# Patient Record
Sex: FEMALE | Race: WHITE | NOT HISPANIC OR LATINO | Employment: UNEMPLOYED | RURAL
[De-identification: names, ages, dates, MRNs, and addresses within clinical notes are randomized per-mention and may not be internally consistent; named-entity substitution may affect disease eponyms.]

---

## 2022-03-28 ENCOUNTER — OFFICE VISIT (OUTPATIENT)
Dept: FAMILY MEDICINE | Facility: CLINIC | Age: 32
End: 2022-03-28
Payer: OTHER GOVERNMENT

## 2022-03-28 VITALS
BODY MASS INDEX: 34.72 KG/M2 | TEMPERATURE: 98 F | HEART RATE: 98 BPM | DIASTOLIC BLOOD PRESSURE: 73 MMHG | HEIGHT: 66 IN | SYSTOLIC BLOOD PRESSURE: 119 MMHG | WEIGHT: 216 LBS | RESPIRATION RATE: 18 BRPM

## 2022-03-28 DIAGNOSIS — E53.9 VITAMIN B DEFICIENCY: ICD-10-CM

## 2022-03-28 DIAGNOSIS — F32.A DEPRESSION, UNSPECIFIED DEPRESSION TYPE: ICD-10-CM

## 2022-03-28 DIAGNOSIS — Z13.220 SCREENING FOR LIPID DISORDERS: ICD-10-CM

## 2022-03-28 DIAGNOSIS — R73.01 IMPAIRED FASTING BLOOD SUGAR: ICD-10-CM

## 2022-03-28 DIAGNOSIS — R53.83 FATIGUE, UNSPECIFIED TYPE: Primary | ICD-10-CM

## 2022-03-28 DIAGNOSIS — E55.9 VITAMIN D DEFICIENCY: ICD-10-CM

## 2022-03-28 PROBLEM — I10 HYPERTENSION: Status: ACTIVE | Noted: 2022-03-28

## 2022-03-28 LAB
25(OH)D3 SERPL-MCNC: 24.1 NG/ML
ALBUMIN SERPL BCP-MCNC: 3.7 G/DL (ref 3.5–5)
ALBUMIN/GLOB SERPL: 1.1 {RATIO}
ALP SERPL-CCNC: 68 U/L (ref 37–98)
ALT SERPL W P-5'-P-CCNC: 23 U/L (ref 13–56)
ANION GAP SERPL CALCULATED.3IONS-SCNC: 10 MMOL/L (ref 7–16)
AST SERPL W P-5'-P-CCNC: 16 U/L (ref 15–37)
BILIRUB SERPL-MCNC: 0.3 MG/DL (ref 0–1.2)
BUN SERPL-MCNC: 9 MG/DL (ref 7–18)
BUN/CREAT SERPL: 10 (ref 6–20)
CALCIUM SERPL-MCNC: 9 MG/DL (ref 8.5–10.1)
CHLORIDE SERPL-SCNC: 107 MMOL/L (ref 98–107)
CHOLEST SERPL-MCNC: 192 MG/DL (ref 0–200)
CHOLEST/HDLC SERPL: 3.7 {RATIO}
CO2 SERPL-SCNC: 28 MMOL/L (ref 21–32)
CREAT SERPL-MCNC: 0.91 MG/DL (ref 0.55–1.02)
CREAT UR-MCNC: 212 MG/DL (ref 28–219)
EST. AVERAGE GLUCOSE BLD GHB EST-MCNC: 81 MG/DL
FOLATE SERPL-MCNC: 6 NG/ML (ref 3.1–17.5)
GLOBULIN SER-MCNC: 3.4 G/DL (ref 2–4)
GLUCOSE SERPL-MCNC: 70 MG/DL (ref 74–106)
HBA1C MFR BLD HPLC: 5 % (ref 4.5–6.6)
HDLC SERPL-MCNC: 52 MG/DL (ref 40–60)
LDLC SERPL CALC-MCNC: 111 MG/DL
LDLC/HDLC SERPL: 2.1 {RATIO}
MICROALBUMIN UR-MCNC: 1.7 MG/DL (ref 0–2.8)
MICROALBUMIN/CREAT RATIO PNL UR: 8 MG/G (ref 0–30)
NONHDLC SERPL-MCNC: 140 MG/DL
POTASSIUM SERPL-SCNC: 4.5 MMOL/L (ref 3.5–5.1)
PROT SERPL-MCNC: 7.1 G/DL (ref 6.4–8.2)
SODIUM SERPL-SCNC: 140 MMOL/L (ref 136–145)
TRIGL SERPL-MCNC: 145 MG/DL (ref 35–150)
TSH SERPL DL<=0.005 MIU/L-ACNC: 2.54 UIU/ML (ref 0.36–3.74)
VIT B12 SERPL-MCNC: 369 PG/ML (ref 193–986)
VLDLC SERPL-MCNC: 29 MG/DL

## 2022-03-28 PROCEDURE — 82043 UR ALBUMIN QUANTITATIVE: CPT | Mod: ,,, | Performed by: CLINICAL MEDICAL LABORATORY

## 2022-03-28 PROCEDURE — 82043 MICROALBUMIN / CREATININE RATIO URINE: ICD-10-PCS | Mod: ,,, | Performed by: CLINICAL MEDICAL LABORATORY

## 2022-03-28 PROCEDURE — 99213 PR OFFICE/OUTPT VISIT, EST, LEVL III, 20-29 MIN: ICD-10-PCS | Mod: ,,, | Performed by: NURSE PRACTITIONER

## 2022-03-28 PROCEDURE — 84443 ASSAY THYROID STIM HORMONE: CPT | Mod: ,,, | Performed by: CLINICAL MEDICAL LABORATORY

## 2022-03-28 PROCEDURE — 82607 VITAMIN B-12: CPT | Mod: ,,, | Performed by: CLINICAL MEDICAL LABORATORY

## 2022-03-28 PROCEDURE — 82746 VITAMIN B12/FOLATE, SERUM PANEL: ICD-10-PCS | Mod: ,,, | Performed by: CLINICAL MEDICAL LABORATORY

## 2022-03-28 PROCEDURE — 82570 ASSAY OF URINE CREATININE: CPT | Mod: ,,, | Performed by: CLINICAL MEDICAL LABORATORY

## 2022-03-28 PROCEDURE — 99213 OFFICE O/P EST LOW 20 MIN: CPT | Mod: ,,, | Performed by: NURSE PRACTITIONER

## 2022-03-28 PROCEDURE — 82306 VITAMIN D: ICD-10-PCS | Mod: ,,, | Performed by: CLINICAL MEDICAL LABORATORY

## 2022-03-28 PROCEDURE — 83036 HEMOGLOBIN A1C: ICD-10-PCS | Mod: ,,, | Performed by: CLINICAL MEDICAL LABORATORY

## 2022-03-28 PROCEDURE — 80061 LIPID PANEL: ICD-10-PCS | Mod: ,,, | Performed by: CLINICAL MEDICAL LABORATORY

## 2022-03-28 PROCEDURE — 80061 LIPID PANEL: CPT | Mod: ,,, | Performed by: CLINICAL MEDICAL LABORATORY

## 2022-03-28 PROCEDURE — 82306 VITAMIN D 25 HYDROXY: CPT | Mod: ,,, | Performed by: CLINICAL MEDICAL LABORATORY

## 2022-03-28 PROCEDURE — 82607 VITAMIN B12/FOLATE, SERUM PANEL: ICD-10-PCS | Mod: ,,, | Performed by: CLINICAL MEDICAL LABORATORY

## 2022-03-28 PROCEDURE — 80053 COMPREHENSIVE METABOLIC PANEL: ICD-10-PCS | Mod: ,,, | Performed by: CLINICAL MEDICAL LABORATORY

## 2022-03-28 PROCEDURE — 82570 MICROALBUMIN / CREATININE RATIO URINE: ICD-10-PCS | Mod: ,,, | Performed by: CLINICAL MEDICAL LABORATORY

## 2022-03-28 PROCEDURE — 80053 COMPREHEN METABOLIC PANEL: CPT | Mod: ,,, | Performed by: CLINICAL MEDICAL LABORATORY

## 2022-03-28 PROCEDURE — 84443 TSH: ICD-10-PCS | Mod: ,,, | Performed by: CLINICAL MEDICAL LABORATORY

## 2022-03-28 PROCEDURE — 82746 ASSAY OF FOLIC ACID SERUM: CPT | Mod: ,,, | Performed by: CLINICAL MEDICAL LABORATORY

## 2022-03-28 PROCEDURE — 83036 HEMOGLOBIN GLYCOSYLATED A1C: CPT | Mod: ,,, | Performed by: CLINICAL MEDICAL LABORATORY

## 2022-03-28 RX ORDER — TRAZODONE HYDROCHLORIDE 100 MG/1
100 TABLET ORAL NIGHTLY
Qty: 30 TABLET | Refills: 11 | Status: SHIPPED | OUTPATIENT
Start: 2022-03-28 | End: 2022-04-19

## 2022-03-28 RX ORDER — ARIPIPRAZOLE 5 MG/1
TABLET ORAL
COMMUNITY
End: 2022-04-19

## 2022-03-28 RX ORDER — IBUPROFEN 800 MG/1
800 TABLET ORAL EVERY 6 HOURS PRN
Qty: 30 TABLET | Refills: 3 | Status: SHIPPED | OUTPATIENT
Start: 2022-03-28 | End: 2022-04-19

## 2022-03-28 RX ORDER — DULOXETIN HYDROCHLORIDE 60 MG/1
CAPSULE, DELAYED RELEASE ORAL
COMMUNITY
End: 2022-06-27

## 2022-03-28 RX ORDER — HYDROXYZINE PAMOATE 25 MG/1
25 CAPSULE ORAL EVERY 6 HOURS PRN
Qty: 45 CAPSULE | Refills: 0 | Status: SHIPPED | OUTPATIENT
Start: 2022-03-28 | End: 2022-04-19

## 2022-03-28 NOTE — PROGRESS NOTES
"   ALIN Sanford   [address] [place] [address]     PATIENT NAME: Esthela Robles  : 1990  DATE: 3/28/22  MRN: [id number]      Billing Provider: ALIN Sanford  Level of Service: [place] OFFICE/OUTPT VISIT, EST, LEVL III, 20-29 MIN  Patient PCP Information     Provider PCP Type    [facility]          Reason for Visit / Chief Complaint: Establish Care       Update PCP  Update Chief Complaint         History of Present Illness / Problem Focused Workflow     Esthela Robles presents to the clinic with Establish Care     HPI    Review of Systems     Review of Systems   Psychiatric/Behavioral: Positive for decreased concentration. Negative for suicidal ideas. The patient is nervous/anxious.         Medical / Social / Family History   History reviewed. No pertinent past medical history.    History reviewed. No pertinent surgical history.    Social History  Ms.  reports that she has been smoking cigarettes. She has never used smokeless tobacco. She reports that she does not drink alcohol and does not use drugs.    Family History  Ms.'s family history is not on file.    No flowsheet data found.        Medications and Allergies     Medications  No outpatient medications have been marked as taking for the 3/28/22 encounter (Office Visit) with ALIN Sanford.       Allergies  Review of patient's allergies indicates:   Allergen Reactions    Codeine        Physical Examination   /73 (BP Location: Left arm, Patient Position: Sitting, BP Method: Medium (Automatic))   Pulse 98   Temp 97.8 °F (36.6 °C) (Temporal)   Resp 18   Ht 5' 6" (1.676 m)   Wt 98 kg (216 lb)   BMI 34.86 kg/m²   Physical Exam  Vitals and nursing note reviewed.   Constitutional:       Appearance: Normal appearance.   HENT:      Head: Normocephalic and atraumatic.      Right Ear: External ear normal.      Left Ear: External ear normal.      Nose: Nose normal.      Mouth/Throat:      Mouth: Mucous membranes are " moist.      Pharynx: Oropharynx is clear.   Eyes:      Pupils: Pupils are equal, round, and reactive to light.   Cardiovascular:      Rate and Rhythm: Normal rate and regular rhythm.      Pulses: Normal pulses.      Heart sounds: Normal heart sounds. No murmur heard.    No gallop.   Pulmonary:      Effort: Pulmonary effort is normal.      Breath sounds: Normal breath sounds. No wheezing or rales.   Abdominal:      General: Bowel sounds are normal. There is no distension.      Palpations: Abdomen is soft.      Tenderness: There is no abdominal tenderness.   Musculoskeletal:      Cervical back: Normal range of motion and neck supple.   Skin:     General: Skin is warm and dry.   Neurological:      General: No focal deficit present.      Mental Status: She is alert and oriented to person, place, and time.   Psychiatric:         Mood and Affect: Mood is anxious and depressed.         Behavior: Behavior normal.          Assessment and Plan (including Health Maintenance)      Problem List  Smart Sets  Document Outside HM   :    Plan:         Health Maintenance Due   Topic Date Due    Hepatitis C Screening  Never done    Cervical Cancer Screening  Never done    Lipid Panel  Never done    COVID-19 Vaccine (1) Never done    Pneumococcal Vaccines (Age 0-64) (1 of 2 - PPSV23) Never done    HIV Screening  Never done    TETANUS VACCINE  Never done    Influenza Vaccine (1) Never done       Problem List Items Addressed This Visit        Psychiatric    Depression    Relevant Orders    TSH       Cardiac/Vascular    Hypertension - Primary    Screening for lipid disorders    Relevant Orders    Lipid Panel       Endocrine    Vitamin B deficiency    Relevant Orders    Vitamin B12 & Folate    Impaired fasting blood sugar    Relevant Orders    Hemoglobin A1C    Vitamin D deficiency    Relevant Orders    Vitamin D          The patient has no Health Maintenance topics of status Not Due    Future Appointments   Date Time Provider  Department Center   4/4/2022  1:15 PM ALIN Sanford [facility] EUNKYLIE Rubenshayla Brynn        Follow up in about 3 months (around 6/28/2022), or if symptoms worsen or fail to improve.        Signature:  ALIN Sanford      [address] [place] [address]    Date of encounter: 3/28/22

## 2022-04-04 ENCOUNTER — OFFICE VISIT (OUTPATIENT)
Dept: FAMILY MEDICINE | Facility: CLINIC | Age: 32
End: 2022-04-04
Payer: OTHER GOVERNMENT

## 2022-04-04 VITALS
WEIGHT: 216 LBS | SYSTOLIC BLOOD PRESSURE: 116 MMHG | RESPIRATION RATE: 20 BRPM | TEMPERATURE: 98 F | BODY MASS INDEX: 34.72 KG/M2 | HEIGHT: 66 IN | HEART RATE: 84 BPM | DIASTOLIC BLOOD PRESSURE: 75 MMHG

## 2022-04-04 DIAGNOSIS — F32.A DEPRESSION, UNSPECIFIED DEPRESSION TYPE: Primary | ICD-10-CM

## 2022-04-04 PROCEDURE — 99213 OFFICE O/P EST LOW 20 MIN: CPT | Mod: ,,, | Performed by: NURSE PRACTITIONER

## 2022-04-04 PROCEDURE — 99213 PR OFFICE/OUTPT VISIT, EST, LEVL III, 20-29 MIN: ICD-10-PCS | Mod: ,,, | Performed by: NURSE PRACTITIONER

## 2022-04-04 RX ORDER — ZOLMITRIPTAN 5 MG/1
5 TABLET, FILM COATED ORAL
Qty: 30 TABLET | Refills: 3 | Status: SHIPPED | OUTPATIENT
Start: 2022-04-04 | End: 2022-06-27

## 2022-04-04 RX ORDER — METHYLPREDNISOLONE 4 MG/1
TABLET ORAL
Qty: 21 EACH | Refills: 0 | Status: SHIPPED | OUTPATIENT
Start: 2022-04-04 | End: 2022-04-19

## 2022-04-04 RX ORDER — AMOXICILLIN AND CLAVULANATE POTASSIUM 875; 125 MG/1; MG/1
1 TABLET, FILM COATED ORAL EVERY 12 HOURS
Qty: 20 TABLET | Refills: 0 | Status: SHIPPED | OUTPATIENT
Start: 2022-04-04 | End: 2022-04-14

## 2022-04-04 RX ORDER — MINERAL OIL
180 ENEMA (ML) RECTAL DAILY
Qty: 30 TABLET | Refills: 11 | Status: SHIPPED | OUTPATIENT
Start: 2022-04-04 | End: 2023-04-18

## 2022-04-04 RX ORDER — FLUCONAZOLE 150 MG/1
150 TABLET ORAL DAILY
Qty: 3 TABLET | Refills: 0 | Status: SHIPPED | OUTPATIENT
Start: 2022-04-04 | End: 2022-04-05

## 2022-04-06 RX ORDER — TOPIRAMATE 100 MG/1
TABLET, FILM COATED ORAL
COMMUNITY
Start: 2021-06-14 | End: 2022-04-19

## 2022-04-06 RX ORDER — OMEPRAZOLE 40 MG/1
40 CAPSULE, DELAYED RELEASE ORAL DAILY
COMMUNITY
Start: 2022-02-19 | End: 2022-10-13 | Stop reason: SDUPTHER

## 2022-04-06 RX ORDER — NABUMETONE 500 MG/1
500 TABLET, FILM COATED ORAL 2 TIMES DAILY
COMMUNITY
Start: 2021-12-06 | End: 2022-04-19

## 2022-04-06 RX ORDER — TOPIRAMATE 25 MG/1
TABLET ORAL
COMMUNITY
Start: 2021-06-14 | End: 2022-04-19

## 2022-04-06 RX ORDER — RIMEGEPANT SULFATE 75 MG/75MG
TABLET, ORALLY DISINTEGRATING ORAL
COMMUNITY
Start: 2021-10-14 | End: 2022-07-21

## 2022-04-06 NOTE — PROGRESS NOTES
"   ALIN Sanford   [address] [place] [address]     PATIENT NAME: Esthela Robles  : 1990  DATE: 22  MRN: [id number]      Billing Provider: ALIN Sanford  Level of Service: [place] OFFICE/OUTPT VISIT, EST, LEVL III, 20-29 MIN  Patient PCP Information     Provider PCP Type    [facility]          Reason for Visit / Chief Complaint: Follow-up       Update PCP  Update Chief Complaint         History of Present Illness / Problem Focused Workflow     Esthela Robles presents to the clinic with Follow-up     HPI    Review of Systems     Review of Systems   Psychiatric/Behavioral: Positive for behavioral problems, decreased concentration and dysphoric mood. Negative for self-injury, sleep disturbance and suicidal ideas. The patient is nervous/anxious.         Medical / Social / Family History   History reviewed. No pertinent past medical history.    History reviewed. No pertinent surgical history.    Social History  Ms.  reports that she has been smoking cigarettes. She has never used smokeless tobacco. She reports that she does not drink alcohol and does not use drugs.    Family History  MsLindsey's family history is not on file.    No flowsheet data found.        Medications and Allergies     Medications  Outpatient Medications Marked as Taking for the 22 encounter (Office Visit) with ALIN Sanford   Medication Sig Dispense Refill    topiramate (TOPAMAX) 100 MG tablet       topiramate (TOPAMAX) 25 MG tablet          Allergies  Review of patient's allergies indicates:   Allergen Reactions    Codeine        Physical Examination   /75 (BP Location: Left arm, Patient Position: Sitting)   Pulse 84   Temp 98 °F (36.7 °C)   Resp 20   Ht 5' 6" (1.676 m)   Wt 98 kg (216 lb)   BMI 34.86 kg/m²   Physical Exam  Vitals and nursing note reviewed.   Constitutional:       Appearance: Normal appearance.   HENT:      Head: Normocephalic and atraumatic.      Right Ear: External ear " normal.      Left Ear: External ear normal.      Nose: Nose normal.      Mouth/Throat:      Mouth: Mucous membranes are moist.      Pharynx: Oropharynx is clear.   Eyes:      Pupils: Pupils are equal, round, and reactive to light.   Cardiovascular:      Rate and Rhythm: Normal rate and regular rhythm.      Pulses: Normal pulses.      Heart sounds: Normal heart sounds. No murmur heard.    No gallop.   Pulmonary:      Effort: Pulmonary effort is normal.      Breath sounds: Normal breath sounds. No wheezing or rales.   Abdominal:      General: Abdomen is flat. Bowel sounds are normal. There is no distension.      Palpations: Abdomen is soft.      Tenderness: There is no abdominal tenderness.   Musculoskeletal:      Cervical back: Normal range of motion and neck supple.   Skin:     General: Skin is warm and dry.   Neurological:      General: No focal deficit present.      Mental Status: She is alert and oriented to person, place, and time.   Psychiatric:         Mood and Affect: Mood normal.         Behavior: Behavior normal.          Assessment and Plan (including Health Maintenance)      Problem List  Smart Sets  Document Outside HM   :    Plan:         Health Maintenance Due   Topic Date Due    Hepatitis C Screening  Never done    Cervical Cancer Screening  Never done    COVID-19 Vaccine (1) Never done    Pneumococcal Vaccines (Age 0-64) (1 of 2 - PPSV23) Never done    HIV Screening  Never done    TETANUS VACCINE  Never done    Influenza Vaccine (1) Never done       Problem List Items Addressed This Visit        Psychiatric    Depression - Primary          The patient has no Health Maintenance topics of status Not Due    Future Appointments   Date Time Provider Department Center   4/19/2022  8:30 AM ALIN Sanford [facility] EUNKYLIE Moreno Brynn        Follow up in about 3 months (around 7/4/2022), or if symptoms worsen or fail to improve.        Signature:  ALIN Sanford      [address] [facility]        [place] [id number]    Date of encounter: 4/4/22

## 2022-04-19 ENCOUNTER — OFFICE VISIT (OUTPATIENT)
Dept: FAMILY MEDICINE | Facility: CLINIC | Age: 32
End: 2022-04-19
Payer: OTHER GOVERNMENT

## 2022-04-19 VITALS
HEIGHT: 66 IN | HEART RATE: 89 BPM | BODY MASS INDEX: 34.55 KG/M2 | SYSTOLIC BLOOD PRESSURE: 119 MMHG | DIASTOLIC BLOOD PRESSURE: 84 MMHG | TEMPERATURE: 98 F | WEIGHT: 215 LBS | RESPIRATION RATE: 18 BRPM

## 2022-04-19 DIAGNOSIS — N91.2 AMENORRHEA: Primary | ICD-10-CM

## 2022-04-19 DIAGNOSIS — F31.9 BIPOLAR DEPRESSION: ICD-10-CM

## 2022-04-19 LAB
B-HCG UR QL: NEGATIVE
CTP QC/QA: NORMAL
HCG SERPL-ACNC: <1 MIU/ML
PROLACTIN SERPL-MCNC: 17.3 NG/ML

## 2022-04-19 PROCEDURE — 81025 POCT URINE PREGNANCY: ICD-10-PCS | Mod: QW,,, | Performed by: NURSE PRACTITIONER

## 2022-04-19 PROCEDURE — 84146 ASSAY OF PROLACTIN: CPT | Mod: ,,, | Performed by: CLINICAL MEDICAL LABORATORY

## 2022-04-19 PROCEDURE — 99213 OFFICE O/P EST LOW 20 MIN: CPT | Mod: ,,, | Performed by: NURSE PRACTITIONER

## 2022-04-19 PROCEDURE — 84702 CHORIONIC GONADOTROPIN TEST: CPT | Mod: ,,, | Performed by: CLINICAL MEDICAL LABORATORY

## 2022-04-19 PROCEDURE — 84702 HCG, TOTAL, QUANTITATIVE: ICD-10-PCS | Mod: ,,, | Performed by: CLINICAL MEDICAL LABORATORY

## 2022-04-19 PROCEDURE — 99213 PR OFFICE/OUTPT VISIT, EST, LEVL III, 20-29 MIN: ICD-10-PCS | Mod: ,,, | Performed by: NURSE PRACTITIONER

## 2022-04-19 PROCEDURE — 84146 PROLACTIN: ICD-10-PCS | Mod: ,,, | Performed by: CLINICAL MEDICAL LABORATORY

## 2022-04-19 PROCEDURE — 81025 URINE PREGNANCY TEST: CPT | Mod: QW,,, | Performed by: NURSE PRACTITIONER

## 2022-04-19 RX ORDER — FLUCONAZOLE 150 MG/1
TABLET ORAL
COMMUNITY
Start: 2022-04-04 | End: 2022-06-27

## 2022-04-19 RX ORDER — AMOXICILLIN AND CLAVULANATE POTASSIUM 875; 125 MG/1; MG/1
TABLET, FILM COATED ORAL
COMMUNITY
Start: 2022-04-04 | End: 2022-06-27

## 2022-04-19 RX ORDER — CARIPRAZINE 4.5 MG/1
4.5 CAPSULE, GELATIN COATED ORAL DAILY
Qty: 90 CAPSULE | Refills: 1 | Status: SHIPPED | OUTPATIENT
Start: 2022-04-19 | End: 2022-05-13

## 2022-04-19 NOTE — PROGRESS NOTES
ALIN Sanford   [address] [place] [address]     PATIENT NAME: Esthela Robles  : 1990  DATE: 22  MRN: [id number]      Billing Provider: ALIN Sanford  Level of Service: [place] OFFICE/OUTPT VISIT, EST, LEVL III, 20-29 MIN  Patient PCP Information     Provider PCP Type    [facility]          Reason for Visit / Chief Complaint: Follow-up (Depression meds) and Amenorrhea       Update PCP  Update Chief Complaint         History of Present Illness / Problem Focused Workflow     Esthela Robles presents to the clinic with Follow-up (Depression meds) and Amenorrhea     HPI    Review of Systems     Review of Systems   Constitutional: Negative for chills, diaphoresis, fatigue and fever.   HENT: Negative for nasal congestion, dental problem, ear pain and postnasal drip.    Eyes: Negative for visual disturbance.   Respiratory: Negative for shortness of breath and wheezing.    Cardiovascular: Negative for chest pain and palpitations.   Gastrointestinal: Negative for abdominal distention and abdominal pain.   Endocrine: Negative for cold intolerance and heat intolerance.   Genitourinary: Positive for menstrual irregularity and menstrual problem. Negative for enuresis.   Musculoskeletal: Negative for neck stiffness.   Integumentary:  Negative for pallor and rash.   Neurological: Negative for dizziness, seizures, speech difficulty and weakness.   Psychiatric/Behavioral: Negative for agitation.        Medical / Social / Family History   History reviewed. No pertinent past medical history.    History reviewed. No pertinent surgical history.    Social History  Ms.  reports that she has been smoking cigarettes. She has never used smokeless tobacco. She reports that she does not drink alcohol and does not use drugs.    Family History  Ms.'s family history includes No Known Problems in her father and mother.    No flowsheet data found.        Medications and Allergies  "    Medications  Outpatient Medications Marked as Taking for the 4/19/22 encounter (Office Visit) with ALIN Sanford   Medication Sig Dispense Refill    amoxicillin-clavulanate 875-125mg (AUGMENTIN) 875-125 mg per tablet       DULoxetine (CYMBALTA) 60 MG capsule duloxetine 60 mg capsule,delayed release   TAKE 1 CAPSULE BY MOUTH DAILY      fexofenadine (ALLEGRA) 180 MG tablet Take 1 tablet (180 mg total) by mouth once daily. 30 tablet 11    fluconazole (DIFLUCAN) 150 MG Tab       omeprazole (PRILOSEC) 40 MG capsule Take 40 mg by mouth once daily.      ZOLMitriptan (ZOMIG) 5 MG tablet Take 1 tablet (5 mg total) by mouth as needed for Migraine. 30 tablet 3    [DISCONTINUED] ibuprofen (ADVIL,MOTRIN) 800 MG tablet Take 1 tablet (800 mg total) by mouth every 6 (six) hours as needed for Pain. 30 tablet 3    [DISCONTINUED] traZODone (DESYREL) 100 MG tablet Take 1 tablet (100 mg total) by mouth every evening. 30 tablet 11       Allergies  Review of patient's allergies indicates:   Allergen Reactions    Codeine        Physical Examination   /84 (BP Location: Left arm, Patient Position: Sitting, BP Method: Medium (Automatic))   Pulse 89   Temp 97.6 °F (36.4 °C) (Temporal)   Resp 18   Ht 5' 6" (1.676 m)   Wt 97.5 kg (215 lb)   BMI 34.70 kg/m²   Physical Exam  Vitals and nursing note reviewed.   Constitutional:       Appearance: Normal appearance.   HENT:      Head: Normocephalic and atraumatic.      Right Ear: External ear normal.      Left Ear: External ear normal.      Nose: Nose normal.      Mouth/Throat:      Mouth: Mucous membranes are moist.      Pharynx: Oropharynx is clear.   Eyes:      Pupils: Pupils are equal, round, and reactive to light.   Cardiovascular:      Rate and Rhythm: Normal rate and regular rhythm.      Pulses: Normal pulses.      Heart sounds: Normal heart sounds. No murmur heard.    No gallop.   Pulmonary:      Effort: Pulmonary effort is normal.      Breath sounds: Normal " breath sounds. No wheezing or rales.   Abdominal:      General: Abdomen is flat. Bowel sounds are normal. There is no distension.      Palpations: Abdomen is soft.      Tenderness: There is no abdominal tenderness.   Musculoskeletal:      Cervical back: Normal range of motion and neck supple.   Skin:     General: Skin is warm and dry.   Neurological:      General: No focal deficit present.      Mental Status: She is alert and oriented to person, place, and time.   Psychiatric:         Mood and Affect: Mood normal.         Behavior: Behavior normal.          Assessment and Plan (including Health Maintenance)      Problem List  Smart Sets  Document Outside HM   :    Plan:         Health Maintenance Due   Topic Date Due    Hepatitis C Screening  Never done    Cervical Cancer Screening  Never done    COVID-19 Vaccine (1) Never done    Pneumococcal Vaccines (Age 0-64) (1 - PCV) Never done    HIV Screening  Never done    TETANUS VACCINE  Never done    Influenza Vaccine (1) Never done       Problem List Items Addressed This Visit        Psychiatric    Bipolar depression    Current Assessment & Plan     Depression symptoms have improved on vraylar, she still thinks room for improvement though           Relevant Medications    cariprazine (VRAYLAR) 4.5 mg Cap       Renal/    Amenorrhea - Primary    Relevant Orders    hCG, Total, Quantitative    Prolactin    POCT urine pregnancy (Completed)          Health Maintenance Topics with due status: Not Due       Topic Last Completion Date    Hemoglobin A1c 03/28/2022       No future appointments.     Follow up in about 3 months (around 7/19/2022), or if symptoms worsen or fail to improve.        Signature:  ALIN Sanford      [address] [place] [address]    Date of encounter: 4/19/22

## 2022-04-21 NOTE — PROGRESS NOTES
Spoke with patient. She is still experiencing symptoms. Told patient to contact us if symptoms persist.

## 2022-05-13 ENCOUNTER — OFFICE VISIT (OUTPATIENT)
Dept: FAMILY MEDICINE | Facility: CLINIC | Age: 32
End: 2022-05-13
Payer: OTHER GOVERNMENT

## 2022-05-13 VITALS
BODY MASS INDEX: 34.87 KG/M2 | RESPIRATION RATE: 18 BRPM | TEMPERATURE: 98 F | HEART RATE: 79 BPM | DIASTOLIC BLOOD PRESSURE: 75 MMHG | SYSTOLIC BLOOD PRESSURE: 118 MMHG | HEIGHT: 66 IN | WEIGHT: 217 LBS

## 2022-05-13 DIAGNOSIS — F32.2 SEVERE DEPRESSION: Primary | ICD-10-CM

## 2022-05-13 DIAGNOSIS — F41.0 PANIC ATTACKS: ICD-10-CM

## 2022-05-13 PROCEDURE — 99213 OFFICE O/P EST LOW 20 MIN: CPT | Mod: ,,, | Performed by: NURSE PRACTITIONER

## 2022-05-13 PROCEDURE — 99213 PR OFFICE/OUTPT VISIT, EST, LEVL III, 20-29 MIN: ICD-10-PCS | Mod: ,,, | Performed by: NURSE PRACTITIONER

## 2022-05-13 RX ORDER — VILAZODONE HYDROCHLORIDE 10 MG-20MG
KIT ORAL
Qty: 30 TABLET | Refills: 0 | Status: SHIPPED | OUTPATIENT
Start: 2022-05-13 | End: 2022-06-27

## 2022-05-13 NOTE — ASSESSMENT & PLAN NOTE
She is here on  assignment with her   She has always struggled with bipolar depression. Has tried zyprexa, vraylar, abilfy. Has also been on celexa and paxil in the past, none of which have helped

## 2022-05-13 NOTE — ASSESSMENT & PLAN NOTE
Reports 4-6 panic attacks per month  Has tried vistaril, made her too sleepy  Was on xanax in the past

## 2022-05-13 NOTE — PROGRESS NOTES
ALIN Sanford   [address] [place] [address]     PATIENT NAME: Esthela Robles  : 1990  DATE: 22  MRN: [id number]      Billing Provider: ALIN Sanford  Level of Service: [place] OFFICE/OUTPT VISIT, EST, LEVL III, 20-29 MIN  Patient PCP Information     Provider PCP Type    [facility]          Reason for Visit / Chief Complaint: Follow-up (vraylar)       Update PCP  Update Chief Complaint         History of Present Illness / Problem Focused Workflow     Esthela Robles presents to the clinic with Follow-up (vraylar)     HPI    Review of Systems     Review of Systems   Psychiatric/Behavioral: Positive for behavioral problems and dysphoric mood.        Medical / Social / Family History     Past Medical History:   Diagnosis Date    Depression        History reviewed. No pertinent surgical history.    Social History  Ms.  reports that she has been smoking cigarettes. She has never used smokeless tobacco. She reports that she does not drink alcohol and does not use drugs.    Family History  Ms.'s family history includes No Known Problems in her father and mother.    PHQ9 2022   Total Score 0           Medications and Allergies     Medications  Outpatient Medications Marked as Taking for the 22 encounter (Office Visit) with ALIN Sanford   Medication Sig Dispense Refill    DULoxetine (CYMBALTA) 60 MG capsule duloxetine 60 mg capsule,delayed release   TAKE 1 CAPSULE BY MOUTH DAILY      omeprazole (PRILOSEC) 40 MG capsule Take 40 mg by mouth once daily.      ZOLMitriptan (ZOMIG) 5 MG tablet Take 1 tablet (5 mg total) by mouth as needed for Migraine. 30 tablet 3    [DISCONTINUED] cariprazine (VRAYLAR) 4.5 mg Cap Take 1 capsule (4.5 mg total) by mouth once daily at 6am. 90 capsule 1       Allergies  Review of patient's allergies indicates:   Allergen Reactions    Codeine        Physical Examination   /75 (BP Location: Left arm, Patient Position: Sitting, BP  "Method: Medium (Automatic))   Pulse 79   Temp 98 °F (36.7 °C) (Temporal)   Resp 18   Ht 5' 6" (1.676 m)   Wt 98.4 kg (217 lb)   LMP 04/21/2022   BMI 35.02 kg/m²   Physical Exam  Vitals and nursing note reviewed.   Constitutional:       Appearance: Normal appearance.   HENT:      Head: Normocephalic and atraumatic.      Right Ear: External ear normal.      Left Ear: External ear normal.      Nose: Nose normal.      Mouth/Throat:      Mouth: Mucous membranes are moist.      Pharynx: Oropharynx is clear.   Eyes:      Pupils: Pupils are equal, round, and reactive to light.   Cardiovascular:      Rate and Rhythm: Normal rate and regular rhythm.      Heart sounds: No murmur heard.    No gallop.   Pulmonary:      Effort: Pulmonary effort is normal.      Breath sounds: No wheezing or rales.   Abdominal:      General: Bowel sounds are normal. There is no distension.      Palpations: Abdomen is soft.      Tenderness: There is no abdominal tenderness.   Musculoskeletal:      Cervical back: Normal range of motion and neck supple.   Skin:     General: Skin is warm and dry.   Neurological:      General: No focal deficit present.      Mental Status: She is alert and oriented to person, place, and time.   Psychiatric:         Mood and Affect: Mood normal.         Behavior: Behavior normal.          Assessment and Plan (including Health Maintenance)      Problem List  Smart Sets  Document Outside HM   :    Plan:         Health Maintenance Due   Topic Date Due    Hepatitis C Screening  Never done    Cervical Cancer Screening  Never done    COVID-19 Vaccine (1) Never done    Pneumococcal Vaccines (Age 0-64) (1 - PCV) Never done    HIV Screening  Never done    TETANUS VACCINE  Never done       Problem List Items Addressed This Visit        Psychiatric    Severe depression - Primary    Current Assessment & Plan     She is here on  assignment with her   She has always struggled with bipolar depression. Has " tried zyprexa, vraylar, abilfy. Has also been on celexa and paxil in the past, none of which have helped           Panic attacks    Current Assessment & Plan     Reports 4-6 panic attacks per month  Has tried vistaril, made her too sleepy  Was on xanax in the past                 Health Maintenance Topics with due status: Not Due       Topic Last Completion Date    Influenza Vaccine Not Due       No future appointments.     Follow up in about 3 months (around 8/13/2022), or if symptoms worsen or fail to improve.        Signature:  Solange Han, ALIN      [address] [place] [address]    Date of encounter: 5/13/22

## 2022-05-16 RX ORDER — CLORAZEPATE DIPOTASSIUM 3.75 MG/1
3.75 TABLET ORAL 3 TIMES DAILY PRN
Qty: 16 TABLET | Refills: 2 | Status: SHIPPED | OUTPATIENT
Start: 2022-05-16 | End: 2022-06-27

## 2022-05-18 ENCOUNTER — OFFICE VISIT (OUTPATIENT)
Dept: FAMILY MEDICINE | Facility: CLINIC | Age: 32
End: 2022-05-18
Payer: OTHER GOVERNMENT

## 2022-05-18 VITALS
DIASTOLIC BLOOD PRESSURE: 83 MMHG | BODY MASS INDEX: 34.87 KG/M2 | RESPIRATION RATE: 18 BRPM | WEIGHT: 217 LBS | SYSTOLIC BLOOD PRESSURE: 127 MMHG | HEIGHT: 66 IN | HEART RATE: 86 BPM | TEMPERATURE: 98 F

## 2022-05-18 DIAGNOSIS — F41.9 SEVERE ANXIETY: ICD-10-CM

## 2022-05-18 DIAGNOSIS — F31.9 BIPOLAR DEPRESSION: Primary | ICD-10-CM

## 2022-05-18 PROCEDURE — 99213 PR OFFICE/OUTPT VISIT, EST, LEVL III, 20-29 MIN: ICD-10-PCS | Mod: ,,, | Performed by: NURSE PRACTITIONER

## 2022-05-18 PROCEDURE — 99213 OFFICE O/P EST LOW 20 MIN: CPT | Mod: ,,, | Performed by: NURSE PRACTITIONER

## 2022-05-18 RX ORDER — FLUOXETINE HYDROCHLORIDE 20 MG/1
20 CAPSULE ORAL DAILY
Qty: 30 CAPSULE | Refills: 11 | Status: SHIPPED | OUTPATIENT
Start: 2022-05-18 | End: 2022-06-27

## 2022-05-18 RX ORDER — LAMOTRIGINE 25 MG/1
25 TABLET ORAL DAILY
Qty: 30 TABLET | Refills: 11 | Status: SHIPPED | OUTPATIENT
Start: 2022-05-18 | End: 2022-06-27

## 2022-05-18 NOTE — PROGRESS NOTES
ALIN Sanford   [address] [place] [address]     PATIENT NAME: Esthela Robles  : 1990  DATE: 22  MRN: [id number]      Billing Provider: ALIN Sanford  Level of Service: [place] OFFICE/OUTPT VISIT, EST, LEVL III, 20-29 MIN  Patient PCP Information     Provider PCP Type    [facility]          Reason for Visit / Chief Complaint: Follow-up (Follow up on her meds)       Update PCP  Update Chief Complaint         History of Present Illness / Problem Focused Workflow     Esthela Robles presents to the clinic with Follow-up (Follow up on her meds)     HPI    Review of Systems     Review of Systems   Constitutional: Negative for chills, diaphoresis, fatigue and fever.   HENT: Negative for nasal congestion, dental problem, ear pain and postnasal drip.    Eyes: Negative for visual disturbance.   Respiratory: Negative for shortness of breath and wheezing.    Cardiovascular: Negative for chest pain and palpitations.   Gastrointestinal: Negative for abdominal distention and abdominal pain.   Endocrine: Negative for cold intolerance and heat intolerance.   Genitourinary: Negative for enuresis.   Musculoskeletal: Negative for neck stiffness.   Integumentary:  Negative for pallor and rash.   Neurological: Negative for dizziness, seizures, speech difficulty and weakness.   Psychiatric/Behavioral: Positive for agitation, decreased concentration, dysphoric mood and sleep disturbance. Negative for suicidal ideas. The patient is nervous/anxious.         Medical / Social / Family History     Past Medical History:   Diagnosis Date    Depression        History reviewed. No pertinent surgical history.    Social History  Ms.  reports that she has been smoking cigarettes. She has never used smokeless tobacco. She reports that she does not drink alcohol and does not use drugs.    Family History  Ms.'s family history includes No Known Problems in her father and mother.    PHQ9 2022   Total Score  "0           Medications and Allergies     Medications  No outpatient medications have been marked as taking for the 5/18/22 encounter (Office Visit) with ALIN Sanford.       Allergies  Review of patient's allergies indicates:   Allergen Reactions    Codeine        Physical Examination   /83 (BP Location: Left arm, Patient Position: Sitting, BP Method: Medium (Automatic))   Pulse 86   Temp 98.2 °F (36.8 °C) (Temporal)   Resp 18   Ht 5' 6" (1.676 m)   Wt 98.4 kg (217 lb)   LMP 04/21/2022   BMI 35.02 kg/m²   Physical Exam  Vitals reviewed.   Constitutional:       Appearance: Normal appearance.   HENT:      Head: Normocephalic and atraumatic.      Right Ear: External ear normal.      Left Ear: External ear normal.      Nose: Nose normal.      Mouth/Throat:      Mouth: Mucous membranes are dry.      Pharynx: Oropharynx is clear.   Eyes:      Pupils: Pupils are equal, round, and reactive to light.   Cardiovascular:      Rate and Rhythm: Normal rate and regular rhythm.      Heart sounds: No murmur heard.    No gallop.   Pulmonary:      Effort: Pulmonary effort is normal.      Breath sounds: No wheezing or rales.   Abdominal:      General: Bowel sounds are normal. There is no distension.      Palpations: Abdomen is soft.      Tenderness: There is no abdominal tenderness.   Musculoskeletal:      Cervical back: Normal range of motion and neck supple.   Skin:     General: Skin is warm and dry.   Neurological:      General: No focal deficit present.      Mental Status: She is alert and oriented to person, place, and time.   Psychiatric:         Mood and Affect: Mood is anxious and depressed.         Behavior: Behavior normal.         Thought Content: Thought content does not include homicidal or suicidal plan.          Assessment and Plan (including Health Maintenance)      Problem List  Smart Sets  Document Outside HM   :    Plan:         Health Maintenance Due   Topic Date Due    Hepatitis C Screening  " Never done    Cervical Cancer Screening  Never done    COVID-19 Vaccine (1) Never done    Pneumococcal Vaccines (Age 0-64) (1 - PCV) Never done    HIV Screening  Never done    TETANUS VACCINE  Never done       Problem List Items Addressed This Visit        Psychiatric    Bipolar depression - Primary    Relevant Orders    Ambulatory referral/consult to Psychiatry    Severe anxiety          Health Maintenance Topics with due status: Not Due       Topic Last Completion Date    Influenza Vaccine Not Due       No future appointments.     Follow up in about 3 months (around 8/18/2022), or if symptoms worsen or fail to improve.        Signature:  ALIN Sanford      [address] [place] [address]    Date of encounter: 5/18/22

## 2022-05-20 ENCOUNTER — PATIENT MESSAGE (OUTPATIENT)
Dept: FAMILY MEDICINE | Facility: CLINIC | Age: 32
End: 2022-05-20
Payer: OTHER GOVERNMENT

## 2022-06-24 ENCOUNTER — PATIENT MESSAGE (OUTPATIENT)
Dept: FAMILY MEDICINE | Facility: CLINIC | Age: 32
End: 2022-06-24
Payer: OTHER GOVERNMENT

## 2022-06-27 ENCOUNTER — OFFICE VISIT (OUTPATIENT)
Dept: FAMILY MEDICINE | Facility: CLINIC | Age: 32
End: 2022-06-27
Payer: OTHER GOVERNMENT

## 2022-06-27 VITALS
BODY MASS INDEX: 35.84 KG/M2 | HEIGHT: 66 IN | SYSTOLIC BLOOD PRESSURE: 119 MMHG | WEIGHT: 223 LBS | RESPIRATION RATE: 18 BRPM | DIASTOLIC BLOOD PRESSURE: 79 MMHG

## 2022-06-27 DIAGNOSIS — Z3A.01 5 WEEKS GESTATION OF PREGNANCY: Primary | ICD-10-CM

## 2022-06-27 DIAGNOSIS — N91.2 AMENORRHEA: ICD-10-CM

## 2022-06-27 PROBLEM — Z13.220 SCREENING FOR LIPID DISORDERS: Status: RESOLVED | Noted: 2022-03-28 | Resolved: 2022-06-27

## 2022-06-27 LAB
ALBUMIN SERPL BCP-MCNC: 3.7 G/DL (ref 3.5–5)
ALBUMIN/GLOB SERPL: 1.1 {RATIO}
ALP SERPL-CCNC: 65 U/L (ref 37–98)
ALT SERPL W P-5'-P-CCNC: 46 U/L (ref 13–56)
ANION GAP SERPL CALCULATED.3IONS-SCNC: 10 MMOL/L (ref 7–16)
AST SERPL W P-5'-P-CCNC: 48 U/L (ref 15–37)
B-HCG UR QL: POSITIVE
BASOPHILS # BLD AUTO: 0.11 K/UL (ref 0–0.2)
BASOPHILS NFR BLD AUTO: 1.3 % (ref 0–1)
BILIRUB SERPL-MCNC: 0.4 MG/DL (ref 0–1.2)
BUN SERPL-MCNC: 4 MG/DL (ref 7–18)
BUN/CREAT SERPL: 5 (ref 6–20)
CALCIUM SERPL-MCNC: 9 MG/DL (ref 8.5–10.1)
CHLORIDE SERPL-SCNC: 108 MMOL/L (ref 98–107)
CO2 SERPL-SCNC: 24 MMOL/L (ref 21–32)
CREAT SERPL-MCNC: 0.77 MG/DL (ref 0.55–1.02)
CTP QC/QA: YES
DIFFERENTIAL METHOD BLD: ABNORMAL
EOSINOPHIL # BLD AUTO: 0.37 K/UL (ref 0–0.5)
EOSINOPHIL NFR BLD AUTO: 4.4 % (ref 1–4)
ERYTHROCYTE [DISTWIDTH] IN BLOOD BY AUTOMATED COUNT: 13.1 % (ref 11.5–14.5)
GLOBULIN SER-MCNC: 3.4 G/DL (ref 2–4)
GLUCOSE SERPL-MCNC: 71 MG/DL (ref 74–106)
HBV SURFACE AG SERPL QL IA: NORMAL
HCT VFR BLD AUTO: 43.1 % (ref 38–47)
HCV AB SER QL: NORMAL
HGB BLD-MCNC: 14.6 G/DL (ref 12–16)
HIV 1+O+2 AB SERPL QL: NORMAL
IMM GRANULOCYTES # BLD AUTO: 0.05 K/UL (ref 0–0.04)
IMM GRANULOCYTES NFR BLD: 0.6 % (ref 0–0.4)
LYMPHOCYTES # BLD AUTO: 2.48 K/UL (ref 1–4.8)
LYMPHOCYTES NFR BLD AUTO: 29.3 % (ref 27–41)
MCH RBC QN AUTO: 31.5 PG (ref 27–31)
MCHC RBC AUTO-ENTMCNC: 33.9 G/DL (ref 32–36)
MCV RBC AUTO: 93.1 FL (ref 80–96)
MONOCYTES # BLD AUTO: 1.06 K/UL (ref 0–0.8)
MONOCYTES NFR BLD AUTO: 12.5 % (ref 2–6)
MPC BLD CALC-MCNC: 10.8 FL (ref 9.4–12.4)
NEUTROPHILS # BLD AUTO: 4.39 K/UL (ref 1.8–7.7)
NEUTROPHILS NFR BLD AUTO: 51.9 % (ref 53–65)
NRBC # BLD AUTO: 0 X10E3/UL
NRBC, AUTO (.00): 0 %
PLATELET # BLD AUTO: 311 K/UL (ref 150–400)
POTASSIUM SERPL-SCNC: 4.1 MMOL/L (ref 3.5–5.1)
PROT SERPL-MCNC: 7.1 G/DL (ref 6.4–8.2)
RBC # BLD AUTO: 4.63 M/UL (ref 4.2–5.4)
SODIUM SERPL-SCNC: 138 MMOL/L (ref 136–145)
SYPHILIS AB INTERPRETATION: NORMAL
WBC # BLD AUTO: 8.46 K/UL (ref 4.5–11)

## 2022-06-27 PROCEDURE — 81025 POCT URINE PREGNANCY: ICD-10-PCS | Mod: QW,,, | Performed by: NURSE PRACTITIONER

## 2022-06-27 PROCEDURE — 99213 PR OFFICE/OUTPT VISIT, EST, LEVL III, 20-29 MIN: ICD-10-PCS | Mod: ,,, | Performed by: NURSE PRACTITIONER

## 2022-06-27 PROCEDURE — 80053 COMPREHEN METABOLIC PANEL: CPT | Mod: ,,, | Performed by: CLINICAL MEDICAL LABORATORY

## 2022-06-27 PROCEDURE — 86803 HEPATITIS C ANTIBODY: ICD-10-PCS | Mod: ,,, | Performed by: CLINICAL MEDICAL LABORATORY

## 2022-06-27 PROCEDURE — 86694 HERPES SIMPLEX NES ANTBDY: CPT | Mod: ,,, | Performed by: CLINICAL MEDICAL LABORATORY

## 2022-06-27 PROCEDURE — 87389 HIV 1 / 2 ANTIBODY: ICD-10-PCS | Mod: ,,, | Performed by: CLINICAL MEDICAL LABORATORY

## 2022-06-27 PROCEDURE — 84702 HCG, TOTAL, QUANTITATIVE: ICD-10-PCS | Mod: ,,, | Performed by: CLINICAL MEDICAL LABORATORY

## 2022-06-27 PROCEDURE — 86803 HEPATITIS C AB TEST: CPT | Mod: ,,, | Performed by: CLINICAL MEDICAL LABORATORY

## 2022-06-27 PROCEDURE — 86696 HERPES SIMPLEX TYPE 2 TEST: CPT | Mod: ,,, | Performed by: CLINICAL MEDICAL LABORATORY

## 2022-06-27 PROCEDURE — 85025 COMPLETE CBC W/AUTO DIFF WBC: CPT | Mod: ,,, | Performed by: CLINICAL MEDICAL LABORATORY

## 2022-06-27 PROCEDURE — 86780 TREPONEMA PALLIDUM: CPT | Mod: ,,, | Performed by: CLINICAL MEDICAL LABORATORY

## 2022-06-27 PROCEDURE — 87340 HEPATITIS B SURFACE ANTIGEN: ICD-10-PCS | Mod: ,,, | Performed by: CLINICAL MEDICAL LABORATORY

## 2022-06-27 PROCEDURE — 84702 CHORIONIC GONADOTROPIN TEST: CPT | Mod: ,,, | Performed by: CLINICAL MEDICAL LABORATORY

## 2022-06-27 PROCEDURE — 87591 CHLAMYDIA/GONORRHOEAE(GC), PCR: ICD-10-PCS | Mod: ,,, | Performed by: CLINICAL MEDICAL LABORATORY

## 2022-06-27 PROCEDURE — 86694 HERPES SIMPLEX 1 & 2 IGM: ICD-10-PCS | Mod: ,,, | Performed by: CLINICAL MEDICAL LABORATORY

## 2022-06-27 PROCEDURE — 87340 HEPATITIS B SURFACE AG IA: CPT | Mod: ,,, | Performed by: CLINICAL MEDICAL LABORATORY

## 2022-06-27 PROCEDURE — 86695 HERPES SIMPLEX 1 & 2 IGG: ICD-10-PCS | Mod: ,,, | Performed by: CLINICAL MEDICAL LABORATORY

## 2022-06-27 PROCEDURE — 87591 N.GONORRHOEAE DNA AMP PROB: CPT | Mod: ,,, | Performed by: CLINICAL MEDICAL LABORATORY

## 2022-06-27 PROCEDURE — 87661 TRICHOMONAS VAGINALIS AMPLIF: CPT | Mod: ,,, | Performed by: CLINICAL MEDICAL LABORATORY

## 2022-06-27 PROCEDURE — 86696 HERPES SIMPLEX 1 & 2 IGG: ICD-10-PCS | Mod: ,,, | Performed by: CLINICAL MEDICAL LABORATORY

## 2022-06-27 PROCEDURE — 86780 TREPONEMA PALLIDUM (SYPHILIS) ANTIBODY: ICD-10-PCS | Mod: ,,, | Performed by: CLINICAL MEDICAL LABORATORY

## 2022-06-27 PROCEDURE — 87491 CHLMYD TRACH DNA AMP PROBE: CPT | Mod: ,,, | Performed by: CLINICAL MEDICAL LABORATORY

## 2022-06-27 PROCEDURE — 86695 HERPES SIMPLEX TYPE 1 TEST: CPT | Mod: ,,, | Performed by: CLINICAL MEDICAL LABORATORY

## 2022-06-27 PROCEDURE — 87529 MAYO GENERIC ORDERABLE: ICD-10-PCS | Mod: 90,,, | Performed by: CLINICAL MEDICAL LABORATORY

## 2022-06-27 PROCEDURE — 87491 CHLAMYDIA/GONORRHOEAE(GC), PCR: ICD-10-PCS | Mod: ,,, | Performed by: CLINICAL MEDICAL LABORATORY

## 2022-06-27 PROCEDURE — 85025 CBC WITH DIFFERENTIAL: ICD-10-PCS | Mod: ,,, | Performed by: CLINICAL MEDICAL LABORATORY

## 2022-06-27 PROCEDURE — 87661 TRICHOMONAS VAGINALIS BY PCR: ICD-10-PCS | Mod: ,,, | Performed by: CLINICAL MEDICAL LABORATORY

## 2022-06-27 PROCEDURE — 81025 URINE PREGNANCY TEST: CPT | Mod: QW,,, | Performed by: NURSE PRACTITIONER

## 2022-06-27 PROCEDURE — 87529 HSV DNA AMP PROBE: CPT | Mod: 90,,, | Performed by: CLINICAL MEDICAL LABORATORY

## 2022-06-27 PROCEDURE — 87389 HIV-1 AG W/HIV-1&-2 AB AG IA: CPT | Mod: ,,, | Performed by: CLINICAL MEDICAL LABORATORY

## 2022-06-27 PROCEDURE — 99213 OFFICE O/P EST LOW 20 MIN: CPT | Mod: ,,, | Performed by: NURSE PRACTITIONER

## 2022-06-27 PROCEDURE — 80053 COMPREHENSIVE METABOLIC PANEL: ICD-10-PCS | Mod: ,,, | Performed by: CLINICAL MEDICAL LABORATORY

## 2022-06-27 RX ORDER — ONDANSETRON 4 MG/1
4 TABLET, FILM COATED ORAL EVERY 6 HOURS PRN
Qty: 60 TABLET | Refills: 1 | Status: SHIPPED | OUTPATIENT
Start: 2022-06-27 | End: 2022-08-17

## 2022-06-27 RX ORDER — SERTRALINE HYDROCHLORIDE 25 MG/1
25 TABLET, FILM COATED ORAL DAILY
Qty: 30 TABLET | Refills: 11 | Status: SHIPPED | OUTPATIENT
Start: 2022-06-27 | End: 2023-04-18 | Stop reason: SDUPTHER

## 2022-06-27 NOTE — ASSESSMENT & PLAN NOTE
- Counseled to avoid cat litter, not garden without gloves, avoid raw meat, heat up deli meat, to eat large fish like tuna no more than once a week, and to avoid soft unpasteurized cheeses.  I recommend a PNV daily.  She should avoid ibuprofen.    - Counseled on Materni T 21.      - Counseled to avoid cat litter, not garden without gloves, avoid raw meat, heat up deli meat, to eat large fish like tuna no more than once a week, and to avoid soft unpasteurized cheeses.  I recommend a PNV daily.  She should avoid ibuprofen.    - Counseled on Materni T 21.

## 2022-06-27 NOTE — PROGRESS NOTES
ALIN Sanford   [address] [place] [address]     PATIENT NAME: Esthela Robles  : 1990  DATE: 22  MRN: [id number]      Billing Provider: ALIN Sanford  Level of Service: CO OFFICE/OUTPT VISIT, EST, LEVL III, 20-29 MIN  Patient PCP Information     Provider PCP Type    ALIN [facility]          Reason for Visit / Chief Complaint: Amenorrhea       Update PCP  Update Chief Complaint         History of Present Illness / Problem Focused Workflow     Esthela Robles presents to the clinic with Amenorrhea     HPI    Review of Systems     Review of Systems   Constitutional: Negative for chills, diaphoresis, fatigue and fever.   HENT: Negative for nasal congestion, dental problem, ear pain and postnasal drip.    Eyes: Negative for visual disturbance.   Respiratory: Negative for shortness of breath and wheezing.    Cardiovascular: Negative for chest pain and palpitations.   Gastrointestinal: Negative for abdominal distention and abdominal pain.   Endocrine: Negative for cold intolerance and heat intolerance.   Genitourinary: Negative for enuresis.   Musculoskeletal: Negative for neck stiffness.   Integumentary:  Negative for pallor and rash.   Neurological: Negative for dizziness, seizures, speech difficulty and weakness.   Psychiatric/Behavioral: Negative for agitation.        Medical / Social / Family History     Past Medical History:   Diagnosis Date    Depression        History reviewed. No pertinent surgical history.    Social History  Ms.  reports that she has been smoking cigarettes. She has never used smokeless tobacco. She reports that she does not drink alcohol and does not use drugs.    Family History  Ms.'s family history includes No Known Problems in her father and mother.    PHQ9 2022   Total Score 0           Medications and Allergies     Medications  Outpatient Medications Marked as Taking for the 22 encounter (Office Visit) with ALIN Sanford  "  Medication Sig Dispense Refill    omeprazole (PRILOSEC) 40 MG capsule Take 40 mg by mouth once daily.         Allergies  Review of patient's allergies indicates:   Allergen Reactions    Codeine     Hazelnut        Physical Examination   /79 (BP Location: Left arm, Patient Position: Sitting, BP Method: Large (Automatic))   Resp 18   Ht 5' 6" (1.676 m)   Wt 101.2 kg (223 lb)   LMP 05/20/2022   BMI 35.99 kg/m²   Physical Exam  Vitals and nursing note reviewed.   Constitutional:       Appearance: Normal appearance.   HENT:      Head: Normocephalic and atraumatic.      Right Ear: External ear normal.      Left Ear: External ear normal.      Nose: Nose normal.      Mouth/Throat:      Mouth: Mucous membranes are moist.      Pharynx: Oropharynx is clear.   Eyes:      Pupils: Pupils are equal, round, and reactive to light.   Cardiovascular:      Rate and Rhythm: Normal rate and regular rhythm.      Pulses: Normal pulses.      Heart sounds: Normal heart sounds. No murmur heard.    No gallop.   Pulmonary:      Effort: Pulmonary effort is normal.      Breath sounds: Normal breath sounds. No wheezing or rales.   Abdominal:      General: Abdomen is flat. Bowel sounds are normal. There is no distension.      Palpations: Abdomen is soft.      Tenderness: There is no abdominal tenderness.   Musculoskeletal:         General: Normal range of motion.      Cervical back: Normal range of motion and neck supple.   Skin:     General: Skin is warm and dry.      Capillary Refill: Capillary refill takes less than 2 seconds.   Neurological:      General: No focal deficit present.      Mental Status: She is alert and oriented to person, place, and time.   Psychiatric:         Mood and Affect: Mood normal.         Behavior: Behavior normal.          Assessment and Plan (including Health Maintenance)      Problem List  Smart Sets  Document Outside HM   :    Plan:         Health Maintenance Due   Topic Date Due    Hepatitis C " Screening  Never done    Cervical Cancer Screening  Never done    COVID-19 Vaccine (1) Never done    Pneumococcal Vaccines (Age 0-64) (1 - PCV) Never done    HIV Screening  Never done    TETANUS VACCINE  Never done       Problem List Items Addressed This Visit        Renal/    Amenorrhea    Relevant Orders    POCT urine pregnancy (Completed)       Obstetric    5 weeks gestation of pregnancy - Primary    Current Assessment & Plan     - Counseled to avoid cat litter, not garden without gloves, avoid raw meat, heat up deli meat, to eat large fish like tuna no more than once a week, and to avoid soft unpasteurized cheeses.  I recommend a PNV daily.  She should avoid ibuprofen.    - Counseled on Materni T 21.      - Counseled to avoid cat litter, not garden without gloves, avoid raw meat, heat up deli meat, to eat large fish like tuna no more than once a week, and to avoid soft unpasteurized cheeses.  I recommend a PNV daily.  She should avoid ibuprofen.    - Counseled on Materni T 21.                 Relevant Medications    ondansetron (ZOFRAN) 4 MG tablet    prenatal 21-iron fu-folic acid 14 mg iron- 400 mcg Tab    Other Relevant Orders    HCG, Quantitative    CBC Auto Differential    Comprehensive Metabolic Panel    Chlamydia/GC, PCR    Hepatitis B Surface Antigen    Hepatitis C Antibody    Herpes Simplex Virus PCR (Blood)    HIV 1/2 Ag/Ab (4th Gen)    Trichomonas vaginalis by PCR    Treponema Pallidum (Syphillis) Antibody    HSV 1 & 2, IgM    HSV 1 & 2, IgG    Ambulatory referral/consult to Obstetrics / Gynecology          Health Maintenance Topics with due status: Not Due       Topic Last Completion Date    Influenza Vaccine Not Due       No future appointments.     No follow-ups on file.        Signature:  ALIN Sanford      [address] [place] [address]    Date of encounter: 6/27/22

## 2022-06-28 LAB
CHLAMYDIA BY PCR: NEGATIVE
HCG SERPL-ACNC: 3890 MIU/ML
HSV IGM SER QL IA: NEGATIVE
HSV TYPE 1 AB IGG INDEX: 7.62
HSV TYPE 2 AB IGG INDEX: 0.09
HSV1 IGG SER QL: POSITIVE
HSV2 IGG SER QL: NEGATIVE
N. GONORRHOEAE (GC) BY PCR: NEGATIVE
TRICHOMONAS NAT: NEGATIVE

## 2022-06-29 ENCOUNTER — PATIENT MESSAGE (OUTPATIENT)
Dept: FAMILY MEDICINE | Facility: CLINIC | Age: 32
End: 2022-06-29
Payer: OTHER GOVERNMENT

## 2022-06-29 DIAGNOSIS — Z36.89 ENCOUNTER TO ESTABLISH GESTATIONAL AGE USING ULTRASOUND: Primary | ICD-10-CM

## 2022-06-29 LAB — MAYO GENERIC ORDERABLE RESULT: NORMAL

## 2022-07-18 ENCOUNTER — HOSPITAL ENCOUNTER (OUTPATIENT)
Dept: RADIOLOGY | Facility: HOSPITAL | Age: 32
Discharge: HOME | End: 2022-07-18
Attending: STUDENT IN AN ORGANIZED HEALTH CARE EDUCATION/TRAINING PROGRAM
Payer: OTHER GOVERNMENT

## 2022-07-18 ENCOUNTER — OFFICE VISIT (OUTPATIENT)
Dept: OBSTETRICS AND GYNECOLOGY | Facility: CLINIC | Age: 32
End: 2022-07-18
Payer: OTHER GOVERNMENT

## 2022-07-18 DIAGNOSIS — Z34.91 FIRST TRIMESTER PREGNANCY: ICD-10-CM

## 2022-07-18 DIAGNOSIS — Z3A.01 5 WEEKS GESTATION OF PREGNANCY: ICD-10-CM

## 2022-07-18 DIAGNOSIS — Z36.89 ENCOUNTER TO ESTABLISH GESTATIONAL AGE USING ULTRASOUND: ICD-10-CM

## 2022-07-18 DIAGNOSIS — O99.341 ANXIETY DURING PREGNANCY IN FIRST TRIMESTER, ANTEPARTUM: Primary | ICD-10-CM

## 2022-07-18 DIAGNOSIS — F41.9 ANXIETY DURING PREGNANCY IN FIRST TRIMESTER, ANTEPARTUM: Primary | ICD-10-CM

## 2022-07-18 DIAGNOSIS — Z3A.08 8 WEEKS GESTATION OF PREGNANCY: ICD-10-CM

## 2022-07-18 PROBLEM — I10 HYPERTENSION: Status: RESOLVED | Noted: 2022-03-28 | Resolved: 2022-07-18

## 2022-07-18 PROBLEM — N91.2 AMENORRHEA: Status: RESOLVED | Noted: 2022-04-19 | Resolved: 2022-07-18

## 2022-07-18 LAB
BILIRUB SERPL-MCNC: NORMAL MG/DL
BLOOD URINE, POC: NORMAL
COLOR, POC UA: YELLOW
GLUCOSE UR QL STRIP: NORMAL
KETONES UR QL STRIP: NORMAL
LEUKOCYTE ESTERASE URINE, POC: NORMAL
NITRITE, POC UA: NORMAL
PH, POC UA: 6.5
PROTEIN, POC: NORMAL
SPECIFIC GRAVITY, POC UA: 1.02
UROBILINOGEN, POC UA: 0.2

## 2022-07-18 PROCEDURE — 76801 US OB <14 WEEKS TRANSABDOM, SINGLE GESTATION: ICD-10-PCS | Mod: 26,,, | Performed by: RADIOLOGY

## 2022-07-18 PROCEDURE — 76801 OB US < 14 WKS SINGLE FETUS: CPT | Mod: TC

## 2022-07-18 PROCEDURE — 87086 URINE CULTURE/COLONY COUNT: CPT | Mod: ,,, | Performed by: CLINICAL MEDICAL LABORATORY

## 2022-07-18 PROCEDURE — 87491 CHLMYD TRACH DNA AMP PROBE: CPT | Mod: ,,, | Performed by: CLINICAL MEDICAL LABORATORY

## 2022-07-18 PROCEDURE — 87591 N.GONORRHOEAE DNA AMP PROB: CPT | Mod: ,,, | Performed by: CLINICAL MEDICAL LABORATORY

## 2022-07-18 PROCEDURE — 76801 OB US < 14 WKS SINGLE FETUS: CPT | Mod: 26,,, | Performed by: RADIOLOGY

## 2022-07-18 PROCEDURE — 87086 CULTURE, URINE: ICD-10-PCS | Mod: ,,, | Performed by: CLINICAL MEDICAL LABORATORY

## 2022-07-18 PROCEDURE — 0501F PR INTITIAL PRENATAL CARE VISIT W/FLOW SHEET: ICD-10-PCS | Mod: S$PBB,,, | Performed by: STUDENT IN AN ORGANIZED HEALTH CARE EDUCATION/TRAINING PROGRAM

## 2022-07-18 PROCEDURE — G0481 DRUG TEST DEF 8-14 CLASSES: HCPCS | Mod: ,,, | Performed by: CLINICAL MEDICAL LABORATORY

## 2022-07-18 PROCEDURE — 81003 URINALYSIS AUTO W/O SCOPE: CPT | Mod: PBBFAC | Performed by: STUDENT IN AN ORGANIZED HEALTH CARE EDUCATION/TRAINING PROGRAM

## 2022-07-18 PROCEDURE — 87491 CHLAMYDIA/GONORRHOEAE(GC), PCR: ICD-10-PCS | Mod: ,,, | Performed by: CLINICAL MEDICAL LABORATORY

## 2022-07-18 PROCEDURE — 87591 CHLAMYDIA/GONORRHOEAE(GC), PCR: ICD-10-PCS | Mod: ,,, | Performed by: CLINICAL MEDICAL LABORATORY

## 2022-07-18 PROCEDURE — 99213 OFFICE O/P EST LOW 20 MIN: CPT | Mod: PBBFAC,25 | Performed by: STUDENT IN AN ORGANIZED HEALTH CARE EDUCATION/TRAINING PROGRAM

## 2022-07-18 PROCEDURE — 0501F PRENATAL FLOW SHEET: CPT | Mod: S$PBB,,, | Performed by: STUDENT IN AN ORGANIZED HEALTH CARE EDUCATION/TRAINING PROGRAM

## 2022-07-18 PROCEDURE — G0481 PR DRUG TEST DEF 8-14 CLASSES: ICD-10-PCS | Mod: ,,, | Performed by: CLINICAL MEDICAL LABORATORY

## 2022-07-18 RX ORDER — ONDANSETRON 4 MG/1
4 TABLET, FILM COATED ORAL EVERY 6 HOURS PRN
Qty: 30 TABLET | Refills: 2 | Status: SHIPPED | OUTPATIENT
Start: 2022-07-18 | End: 2022-08-17

## 2022-07-18 RX ORDER — OMEPRAZOLE 40 MG/1
40 CAPSULE, DELAYED RELEASE ORAL DAILY
Qty: 30 CAPSULE | Refills: 11 | Status: SHIPPED | OUTPATIENT
Start: 2022-07-18 | End: 2023-04-18

## 2022-07-18 NOTE — PROGRESS NOTES
New OB History and Physical    CC:   Chief Complaint   Patient presents with    Initial Prenatal Visit     INOB: Pt states she been in some nausea & vomiting, some swelling of legs. Pt wants something sent in for acid reflux well. Last pap Jan 2022 & it was normal        Assessment/Plan:   Esthela Robles is a 31 y.o. at 8w0d who presents for new OB visit    Problem List Items Addressed This Visit        Psychiatric    Anxiety during pregnancy in first trimester, antepartum    Overview     Currently on zoloft 25 mg              Obstetric    5 weeks gestation of pregnancy    8 weeks gestation of pregnancy    Overview     Estimated Date of Delivery: 2/27/23 established by 8wd     First Trimester  - Prenatal Battery: ordered  - UDS: ordered  - Pap Smear:  collected   - GC/C: ordered      Second Trimester  - Quad: Plan to offer at 15-19wks.  - Anatomy Ultrasound: Will schedule for 20wks    Third Trimester  - 28 wk labs:   - Rh Status: pending  - 1Hr GCT:   - GC/C:   - GBS:   - EFW/SANA:    Vaccines  - Influenza:  - TDAP: Plan to give around 32wks  - COVID:     Contraception:             Other Visit Diagnoses     First trimester pregnancy    -  Primary    Relevant Orders    HIV 1/2 Ag/Ab (4th Gen)    Hepatitis B Surface Antigen    Type & Screen (Completed)    POCT URINALYSIS W/O SCOPE (Completed)    Urine culture    CBC Auto Differential (Completed)    Hepatitis C Antibody    OB Drug Screen Definitive, Urine    Basic Metabolic Panel (Completed)    Treponema Pallidum (Syphillis) Antibody    Rubella Antibody Screen    Chlamydia/GC, PCR          HPI: Esthela Robles is a 31 y.o. at 8w0d who presents for new OB visit.       Review of Systems: The following ROS was otherwise negative, except as noted in the HPI:  constitutional, HEENT, respiratory, cardiovascular, gastrointestinal, genitourinary, skin, musculoskeletal, neurological, psych    Gynecologic History: Denies hx of abnl pap smears.  No hx of STIs.    Obstetrical History:  OB  History        3    Para   1    Term   1            AB   1    Living   1       SAB   1    IAB        Ectopic        Multiple        Live Births   1                 Past Medical History:   Past Medical History:   Diagnosis Date    Depression        Medications:  Medication List with Changes/Refills   New Medications    OMEPRAZOLE (PRILOSEC) 40 MG CAPSULE    Take 1 capsule (40 mg total) by mouth once daily.    ONDANSETRON (ZOFRAN) 4 MG TABLET    Take 1 tablet (4 mg total) by mouth every 6 (six) hours as needed for Nausea.   Current Medications    FEXOFENADINE (ALLEGRA) 180 MG TABLET    Take 1 tablet (180 mg total) by mouth once daily.    NURTEC 75 MG ODT    Take by mouth.    OMEPRAZOLE (PRILOSEC) 40 MG CAPSULE    Take 40 mg by mouth once daily.    ONDANSETRON (ZOFRAN) 4 MG TABLET    Take 1 tablet (4 mg total) by mouth every 6 (six) hours as needed for Nausea.    PRENATAL 21-IRON FU-FOLIC ACID 14 MG IRON- 400 MCG TAB    Take 1 tablet by mouth once daily.    SERTRALINE (ZOLOFT) 25 MG TABLET    Take 1 tablet (25 mg total) by mouth once daily.         Allergies:  Codeine and Hazelnut    Surgical History:  Past Surgical History:   Procedure Laterality Date    CHOLECYSTECTOMY         Family History:  Family History   Problem Relation Age of Onset    No Known Problems Mother     No Known Problems Father     Diabetes Paternal Grandfather     Diabetes Maternal Grandmother        Social History:  Social History     Substance and Sexual Activity   Alcohol Use Never     Social History     Substance and Sexual Activity   Drug Use Never     Social History     Tobacco Use   Smoking Status Current Every Day Smoker    Types: Cigarettes   Smokeless Tobacco Never Used   Tobacco Comment    2 cigarretts a day       Physical Exam:  LMP 2022     General: Alert, well appearing, no acute distress  Head: Normocephalic, atraumatic  Lungs: unlabored respirations  Pelvic: deferred  Extremities: No redness or  tenderness  Skin: Well perfused, normal coloration and turgor, no lesions or rashes visualized  Neuro: Alert, oriented, normal speech, no focal deficits, moves extremities appropriately  Psych: Appropriate, normal affect, appears stated age  Osteopathic: No TART changes      Reviewed frequency of appointments for routine prenatal care, call group partners.  Pregnancy book given, encouraged to read through for questions regarding food/drink and medication safety in pregnancy.  Encouraged Mychart access.  Instructed to stop by the lab after visit for NOB labwork.

## 2022-07-19 LAB
CHLAMYDIA BY PCR: NEGATIVE
N. GONORRHOEAE (GC) BY PCR: NEGATIVE

## 2022-07-20 LAB — UA COMPLETE W REFLEX CULTURE PNL UR: NORMAL

## 2022-07-21 ENCOUNTER — TELEPHONE (OUTPATIENT)
Dept: OBSTETRICS AND GYNECOLOGY | Facility: CLINIC | Age: 32
End: 2022-07-21
Payer: OTHER GOVERNMENT

## 2022-07-21 RX ORDER — BUTALBITAL, ACETAMINOPHEN AND CAFFEINE 50; 325; 40 MG/1; MG/1; MG/1
1 TABLET ORAL EVERY 6 HOURS PRN
Qty: 30 TABLET | Refills: 1 | Status: SHIPPED | OUTPATIENT
Start: 2022-07-21 | End: 2022-08-20

## 2022-07-22 LAB
7-AMINOCLONAZEPAM, URINE (RUSH): NEGATIVE 25 NG/ML
A-HYDROXYALPRAZOLAM, URINE (RUSH): NEGATIVE 25 NG/ML
AMITRIPTYLINE, URINE (RUSH): NEGATIVE 25 NG/ML
BENZOYLECGONINE, URINE (RUSH): NEGATIVE 100 NG/ML
BUTALBITAL, URINE (RUSH): NEGATIVE 50 NG/ML
CARISOPRODOL, URINE (RUSH): NEGATIVE 100 NG/ML
CODEINE, URINE (RUSH): NEGATIVE 25 NG/ML
CREAT UR-MCNC: 141 MG/DL (ref 28–219)
EDDP, URINE (RUSH): NEGATIVE 25 NG/ML
HYDROCODONE, URINE (RUSH): NEGATIVE 25 NG/ML
HYDROMORPHONE, URINE (RUSH): NEGATIVE 25 NG/ML
LORAZEPAM, URINE (RUSH): NEGATIVE 25 NG/ML
MEPROBAMATE, URINE (RUSH): NEGATIVE 100 NG/ML
METHADONE UR QL SCN: NEGATIVE 25 NG/ML
METHAMPHET UR QL SCN: NEGATIVE
MORPHINE, URINE (RUSH): NEGATIVE 25 NG/ML
NORDIAZEPAM, URINE (RUSH): NEGATIVE 25 NG/ML
NORHYDROCODONE, URINE (RUSH): NEGATIVE 50 NG/ML
NOROXYCODONE HCL, URINE (RUSH): NEGATIVE 50 NG/ML
OXAZEPAM, URINE (RUSH): 43 25 NG/ML
OXYCODONE UR QL SCN: NEGATIVE 25 NG/ML
OXYMORPHONE, URINE (RUSH): NEGATIVE 25 NG/ML
PH UR STRIP: 6.5 PH UNITS
PHENOBARBITAL, URINE (RUSH): NEGATIVE 50 NG/ML
SECOBARBITAL, URINE (RUSH): NEGATIVE 50 NG/ML
SP GR UR STRIP: 1.02
TEMAZEPAM, URINE (RUSH): NEGATIVE 25 NG/ML
THC-COOH, URINE (RUSH): NEGATIVE 25 NG/ML

## 2022-08-17 ENCOUNTER — ROUTINE PRENATAL (OUTPATIENT)
Dept: OBSTETRICS AND GYNECOLOGY | Facility: CLINIC | Age: 32
End: 2022-08-17
Payer: OTHER GOVERNMENT

## 2022-08-17 VITALS — BODY MASS INDEX: 34.86 KG/M2 | WEIGHT: 216 LBS | SYSTOLIC BLOOD PRESSURE: 120 MMHG | DIASTOLIC BLOOD PRESSURE: 70 MMHG

## 2022-08-17 DIAGNOSIS — F41.9 ANXIETY DURING PREGNANCY IN FIRST TRIMESTER, ANTEPARTUM: Primary | ICD-10-CM

## 2022-08-17 DIAGNOSIS — O99.341 ANXIETY DURING PREGNANCY IN FIRST TRIMESTER, ANTEPARTUM: Primary | ICD-10-CM

## 2022-08-17 DIAGNOSIS — O21.9 NAUSEA/VOMITING IN PREGNANCY: ICD-10-CM

## 2022-08-17 DIAGNOSIS — Z3A.12 12 WEEKS GESTATION OF PREGNANCY: ICD-10-CM

## 2022-08-17 LAB
BILIRUB SERPL-MCNC: NORMAL MG/DL
BLOOD URINE, POC: NORMAL
COLOR, POC UA: NORMAL
GLUCOSE UR QL STRIP: NEGATIVE
KETONES UR QL STRIP: NORMAL
LEUKOCYTE ESTERASE URINE, POC: NEGATIVE
NITRITE, POC UA: NEGATIVE
PH, POC UA: 7
PROTEIN, POC: NEGATIVE
SPECIFIC GRAVITY, POC UA: 1.02
UROBILINOGEN, POC UA: 0.2

## 2022-08-17 PROCEDURE — 99213 OFFICE O/P EST LOW 20 MIN: CPT | Mod: PBBFAC | Performed by: STUDENT IN AN ORGANIZED HEALTH CARE EDUCATION/TRAINING PROGRAM

## 2022-08-17 PROCEDURE — 0502F SUBSEQUENT PRENATAL CARE: CPT | Mod: ,,, | Performed by: STUDENT IN AN ORGANIZED HEALTH CARE EDUCATION/TRAINING PROGRAM

## 2022-08-17 PROCEDURE — 81003 URINALYSIS AUTO W/O SCOPE: CPT | Mod: PBBFAC | Performed by: STUDENT IN AN ORGANIZED HEALTH CARE EDUCATION/TRAINING PROGRAM

## 2022-08-17 PROCEDURE — 0502F PR SUBSEQUENT PRENATAL CARE: ICD-10-PCS | Mod: ,,, | Performed by: STUDENT IN AN ORGANIZED HEALTH CARE EDUCATION/TRAINING PROGRAM

## 2022-08-17 RX ORDER — ONDANSETRON 4 MG/1
4 TABLET, ORALLY DISINTEGRATING ORAL EVERY 6 HOURS PRN
Qty: 30 TABLET | Refills: 2 | Status: SHIPPED | OUTPATIENT
Start: 2022-08-17 | End: 2023-04-18

## 2022-08-17 NOTE — PROGRESS NOTES
Subjective:      Esthela Robles is a 31 y.o. female being seen today for her obstetrical visit. She is at 12w2d gestation. Patient reports extreme nausea and can not keep anything but sprite down . Fetal movement: normal.    Menstrual History:  OB History        3    Para   1    Term   1            AB   1    Living   1       SAB   1    IAB        Ectopic        Multiple        Live Births   1                  Review of Systems  Pertinent items are noted in HPI.     Objective:       /70   Wt 98 kg (216 lb)   LMP 2022   BMI 34.86 kg/m²   Uterine Size: size equals dates           Assessment:      Pregnancy 12 and 2/7 weeks    N/V    Plan:      Problem list reviewed and updated.  Labs reviewed.  Rx for zofran sent to pharmacy  Follow up in 4 weeks.

## 2022-08-19 PROBLEM — O21.9 NAUSEA/VOMITING IN PREGNANCY: Status: ACTIVE | Noted: 2022-08-19

## 2022-08-30 ENCOUNTER — CLINICAL SUPPORT (OUTPATIENT)
Dept: OBSTETRICS AND GYNECOLOGY | Facility: CLINIC | Age: 32
End: 2022-08-30
Payer: OTHER GOVERNMENT

## 2022-08-30 PROCEDURE — 99499 UNLISTED E&M SERVICE: CPT | Mod: S$PBB,,, | Performed by: STUDENT IN AN ORGANIZED HEALTH CARE EDUCATION/TRAINING PROGRAM

## 2022-08-30 PROCEDURE — 99499 NO LOS: ICD-10-PCS | Mod: S$PBB,,, | Performed by: STUDENT IN AN ORGANIZED HEALTH CARE EDUCATION/TRAINING PROGRAM

## 2022-08-30 PROCEDURE — 99212 OFFICE O/P EST SF 10 MIN: CPT | Mod: PBBFAC

## 2022-09-14 ENCOUNTER — ROUTINE PRENATAL (OUTPATIENT)
Dept: OBSTETRICS AND GYNECOLOGY | Facility: CLINIC | Age: 32
End: 2022-09-14
Payer: OTHER GOVERNMENT

## 2022-09-14 VITALS — DIASTOLIC BLOOD PRESSURE: 68 MMHG | WEIGHT: 214 LBS | BODY MASS INDEX: 34.54 KG/M2 | SYSTOLIC BLOOD PRESSURE: 112 MMHG

## 2022-09-14 DIAGNOSIS — Z3A.16 16 WEEKS GESTATION OF PREGNANCY: ICD-10-CM

## 2022-09-14 DIAGNOSIS — F41.9 ANXIETY DURING PREGNANCY, ANTEPARTUM, SECOND TRIMESTER: Primary | ICD-10-CM

## 2022-09-14 DIAGNOSIS — O99.342 ANXIETY DURING PREGNANCY, ANTEPARTUM, SECOND TRIMESTER: Primary | ICD-10-CM

## 2022-09-14 LAB
BILIRUB SERPL-MCNC: NORMAL MG/DL
BLOOD URINE, POC: NORMAL
COLOR, POC UA: YELLOW
GLUCOSE UR QL STRIP: NORMAL
KETONES UR QL STRIP: NORMAL
LEUKOCYTE ESTERASE URINE, POC: NORMAL
NITRITE, POC UA: NORMAL
PH, POC UA: 7.5
PROTEIN, POC: NORMAL
SPECIFIC GRAVITY, POC UA: 1
UROBILINOGEN, POC UA: 0.2

## 2022-09-14 PROCEDURE — 0502F PR SUBSEQUENT PRENATAL CARE: ICD-10-PCS | Mod: ,,, | Performed by: STUDENT IN AN ORGANIZED HEALTH CARE EDUCATION/TRAINING PROGRAM

## 2022-09-14 PROCEDURE — 0502F SUBSEQUENT PRENATAL CARE: CPT | Mod: ,,, | Performed by: STUDENT IN AN ORGANIZED HEALTH CARE EDUCATION/TRAINING PROGRAM

## 2022-09-14 PROCEDURE — 99213 OFFICE O/P EST LOW 20 MIN: CPT | Mod: PBBFAC | Performed by: STUDENT IN AN ORGANIZED HEALTH CARE EDUCATION/TRAINING PROGRAM

## 2022-09-14 PROCEDURE — 81003 URINALYSIS AUTO W/O SCOPE: CPT | Mod: PBBFAC | Performed by: STUDENT IN AN ORGANIZED HEALTH CARE EDUCATION/TRAINING PROGRAM

## 2022-09-14 NOTE — PROGRESS NOTES
Subjective:      Esthela Robles is a 31 y.o. female being seen today for her obstetrical visit. She is at 16w2d gestation. Patient reports  pelvic pressure . Fetal movement:  to early .    Menstrual History:  OB History          3    Para   1    Term   1            AB   1    Living   1         SAB   1    IAB        Ectopic        Multiple        Live Births   1                        Review of Systems  Pertinent items are noted in HPI.     Objective:       /68   Wt 97.1 kg (214 lb)   LMP 2022   BMI 34.54 kg/m²   Uterine Size: size equals dates    FHTs 156    Assessment:      Pregnancy 16 and 2/7 weeks     Plan:     US ordered  Follow up in 4 weeks.  25% of 15 minute visit spent on counseling and coordination of care.

## 2022-09-16 ENCOUNTER — TELEPHONE (OUTPATIENT)
Dept: OBSTETRICS AND GYNECOLOGY | Facility: CLINIC | Age: 32
End: 2022-09-16
Payer: OTHER GOVERNMENT

## 2022-09-16 NOTE — TELEPHONE ENCOUNTER
----- Message from Jeannie Boo sent at 2022 10:24 AM CDT -----  Please call patient need to talk about med.         [phone]        Phone [phone]          Thanks  Jeannie      
 used

## 2022-09-29 ENCOUNTER — OFFICE VISIT (OUTPATIENT)
Dept: FAMILY MEDICINE | Facility: CLINIC | Age: 32
End: 2022-09-29
Payer: OTHER GOVERNMENT

## 2022-09-29 VITALS
HEIGHT: 66 IN | DIASTOLIC BLOOD PRESSURE: 74 MMHG | TEMPERATURE: 97 F | BODY MASS INDEX: 34.44 KG/M2 | OXYGEN SATURATION: 97 % | WEIGHT: 214.31 LBS | SYSTOLIC BLOOD PRESSURE: 119 MMHG | HEART RATE: 86 BPM

## 2022-09-29 DIAGNOSIS — R05.9 COUGH: ICD-10-CM

## 2022-09-29 DIAGNOSIS — J32.9 SINUSITIS, UNSPECIFIED CHRONICITY, UNSPECIFIED LOCATION: Primary | ICD-10-CM

## 2022-09-29 PROCEDURE — 99213 PR OFFICE/OUTPT VISIT, EST, LEVL III, 20-29 MIN: ICD-10-PCS | Mod: ,,, | Performed by: NURSE PRACTITIONER

## 2022-09-29 PROCEDURE — 99213 OFFICE O/P EST LOW 20 MIN: CPT | Mod: ,,, | Performed by: NURSE PRACTITIONER

## 2022-09-29 RX ORDER — CETIRIZINE HYDROCHLORIDE 10 MG/1
10 TABLET ORAL DAILY
Qty: 30 TABLET | Refills: 0 | Status: SHIPPED | OUTPATIENT
Start: 2022-09-29 | End: 2023-04-18

## 2022-09-29 RX ORDER — FLUTICASONE PROPIONATE 50 MCG
1 SPRAY, SUSPENSION (ML) NASAL DAILY
Qty: 11.1 ML | Refills: 0 | Status: SHIPPED | OUTPATIENT
Start: 2022-09-29 | End: 2023-09-11 | Stop reason: SDUPTHER

## 2022-09-29 RX ORDER — CEFDINIR 300 MG/1
300 CAPSULE ORAL 2 TIMES DAILY
Qty: 14 CAPSULE | Refills: 0 | Status: SHIPPED | OUTPATIENT
Start: 2022-09-29 | End: 2022-10-06

## 2022-09-29 NOTE — PROGRESS NOTES
"   Hayley Donis DNP   [address] [place] [address]     PATIENT NAME: Esthela Robles  : 1990  DATE: 22  MRN: [id number]      Billing Provider: Hayley Donis DNP  Level of Service:   Patient PCP Information       Provider PCP Type    Kelly Gee NP General            Reason for Visit / Chief Complaint: Sinus Problem       Update PCP  Update Chief Complaint         History of Present Illness / Problem Focused Workflow     Esthela Robles presents to the clinic with Sinus Problem     Sinus Problem    Review of Systems     Review of Systems     Medical / Social / Family History     Past Medical History:   Diagnosis Date    Depression        Past Surgical History:   Procedure Laterality Date    CHOLECYSTECTOMY  2013       Social History  Ms.  reports that she has been smoking cigarettes. She has never used smokeless tobacco. She reports that she does not drink alcohol and does not use drugs.    Family History  Ms.'s family history includes Diabetes in her maternal grandmother and paternal grandfather; No Known Problems in her father and mother.    Medications and Allergies     Medications  Outpatient Medications Marked as Taking for the 22 encounter (Office Visit) with Hayley Donis DNP   Medication Sig Dispense Refill    omeprazole (PRILOSEC) 40 MG capsule Take 1 capsule (40 mg total) by mouth once daily. 30 capsule 11    ondansetron (ZOFRAN-ODT) 4 MG TbDL Take 1 tablet (4 mg total) by mouth every 6 (six) hours as needed (nausea). 30 tablet 2    prenatal 21-iron fu-folic acid 14 mg iron- 400 mcg Tab Take 1 tablet by mouth once daily. 90 tablet 3    sertraline (ZOLOFT) 25 MG tablet Take 1 tablet (25 mg total) by mouth once daily. 30 tablet 11       Allergies  Review of patient's allergies indicates:   Allergen Reactions    Codeine Nausea And Vomiting    Hazelnut        Physical Examination   /74   Pulse 86   Temp 97.4 °F (36.3 °C)   Ht 5' 6" (1.676 m)  "  Wt 97.2 kg (214 lb 4.8 oz)   LMP 05/20/2022   SpO2 97%   BMI 34.59 kg/m²    Physical Exam  Vitals and nursing note reviewed.   Constitutional:       Appearance: She is ill-appearing.   HENT:      Head: Normocephalic.      Ears:      Comments: Dull TM with fluid bubbles noted bilateral.     Nose: Congestion and rhinorrhea present.      Mouth/Throat:      Mouth: Mucous membranes are moist.      Pharynx: Posterior oropharyngeal erythema present.   Eyes:      Extraocular Movements: Extraocular movements intact.      Conjunctiva/sclera: Conjunctivae normal.      Pupils: Pupils are equal, round, and reactive to light.   Cardiovascular:      Rate and Rhythm: Normal rate and regular rhythm.      Pulses: Normal pulses.      Heart sounds: Normal heart sounds.   Pulmonary:      Effort: Pulmonary effort is normal.      Breath sounds: Normal breath sounds.   Chest:      Chest wall: Tenderness present.   Musculoskeletal:      Cervical back: Normal range of motion.   Skin:     General: Skin is warm and dry.      Capillary Refill: Capillary refill takes less than 2 seconds.   Neurological:      General: No focal deficit present.      Mental Status: She is alert and oriented to person, place, and time. Mental status is at baseline.   Psychiatric:         Mood and Affect: Mood normal.         Behavior: Behavior normal.         Thought Content: Thought content normal.         Judgment: Judgment normal.        Assessment and Plan (including Health Maintenance)      Problem List  Smart Sets  Document Outside HM   :    Plan:         Health Maintenance Due   Topic Date Due    Cervical Cancer Screening  Never done    COVID-19 Vaccine (1) Never done    Pneumococcal Vaccines (Age 0-64) (1 - PCV) Never done    TETANUS VACCINE  Never done    Influenza Vaccine (1) Never done       Problem List Items Addressed This Visit          ENT    Sinusitis - Primary       Pulmonary    Cough       The patient has no Health Maintenance topics of  status Not Due    Future Appointments   Date Time Provider Department Center   9/29/2022 10:30 AM Hayley Donis DNP [facility]   10/6/2022 10:00 AM [address] [facility]   10/13/2022 10:45 AM Amrik Craven DO OB [facility]            Signature:  Hayley Donis DNP      [address] [place] [address]    Date of encounter: 9/29/22

## 2022-10-06 ENCOUNTER — HOSPITAL ENCOUNTER (OUTPATIENT)
Dept: RADIOLOGY | Facility: HOSPITAL | Age: 32
Discharge: HOME | End: 2022-10-06
Attending: STUDENT IN AN ORGANIZED HEALTH CARE EDUCATION/TRAINING PROGRAM
Payer: OTHER GOVERNMENT

## 2022-10-06 DIAGNOSIS — O30.012 MONOAMNIOTIC-MONOCHORIONIC TWIN PREGNANCY IN SECOND TRIMESTER: Primary | ICD-10-CM

## 2022-10-06 DIAGNOSIS — O26.842 SIZE OF FETUS INCONSISTENT WITH DATES IN SECOND TRIMESTER: ICD-10-CM

## 2022-10-06 PROCEDURE — 76817 TRANSVAGINAL US OBSTETRIC: CPT | Mod: 26,,, | Performed by: RADIOLOGY

## 2022-10-06 PROCEDURE — 76805 OB US >/= 14 WKS SNGL FETUS: CPT | Mod: 26,,, | Performed by: RADIOLOGY

## 2022-10-06 PROCEDURE — 76810 OB US >/= 14 WKS ADDL FETUS: CPT | Mod: 26,,, | Performed by: RADIOLOGY

## 2022-10-06 PROCEDURE — 76805 US OB 14+ WEEKS, TRANSABDOM & TRANSVAG, TWIN GESTATION (XPD): ICD-10-PCS | Mod: 26,,, | Performed by: RADIOLOGY

## 2022-10-06 PROCEDURE — 76817 US OB 14+ WEEKS, TRANSABDOM & TRANSVAG, TWIN GESTATION (XPD): ICD-10-PCS | Mod: 26,,, | Performed by: RADIOLOGY

## 2022-10-06 PROCEDURE — 76810 US OB 14+ WEEKS, TRANSABDOM & TRANSVAG, TWIN GESTATION (XPD): ICD-10-PCS | Mod: 26,,, | Performed by: RADIOLOGY

## 2022-10-06 PROCEDURE — 76810 OB US >/= 14 WKS ADDL FETUS: CPT | Mod: TC

## 2022-10-13 ENCOUNTER — ROUTINE PRENATAL (OUTPATIENT)
Dept: OBSTETRICS AND GYNECOLOGY | Facility: CLINIC | Age: 32
End: 2022-10-13
Payer: OTHER GOVERNMENT

## 2022-10-13 VITALS — DIASTOLIC BLOOD PRESSURE: 78 MMHG | SYSTOLIC BLOOD PRESSURE: 120 MMHG | BODY MASS INDEX: 35.02 KG/M2 | WEIGHT: 217 LBS

## 2022-10-13 DIAGNOSIS — Z3A.20 20 WEEKS GESTATION OF PREGNANCY: ICD-10-CM

## 2022-10-13 DIAGNOSIS — O30.012 MONOAMNIOTIC-MONOCHORIONIC TWIN PREGNANCY IN SECOND TRIMESTER: Primary | ICD-10-CM

## 2022-10-13 DIAGNOSIS — F41.9 ANXIETY DURING PREGNANCY IN FIRST TRIMESTER, ANTEPARTUM: ICD-10-CM

## 2022-10-13 DIAGNOSIS — O99.341 ANXIETY DURING PREGNANCY IN FIRST TRIMESTER, ANTEPARTUM: ICD-10-CM

## 2022-10-13 LAB
BILIRUB SERPL-MCNC: NORMAL MG/DL
BLOOD URINE, POC: NORMAL
COLOR, POC UA: NORMAL
GLUCOSE UR QL STRIP: NORMAL
KETONES UR QL STRIP: NORMAL
LEUKOCYTE ESTERASE URINE, POC: NORMAL
NITRITE, POC UA: NORMAL
PH, POC UA: 7
PROTEIN, POC: NORMAL
SPECIFIC GRAVITY, POC UA: 1.01
UROBILINOGEN, POC UA: 0.2

## 2022-10-13 PROCEDURE — 0502F PR SUBSEQUENT PRENATAL CARE: ICD-10-PCS | Mod: ,,, | Performed by: STUDENT IN AN ORGANIZED HEALTH CARE EDUCATION/TRAINING PROGRAM

## 2022-10-13 PROCEDURE — 81003 URINALYSIS AUTO W/O SCOPE: CPT | Mod: PBBFAC | Performed by: STUDENT IN AN ORGANIZED HEALTH CARE EDUCATION/TRAINING PROGRAM

## 2022-10-13 PROCEDURE — 99213 OFFICE O/P EST LOW 20 MIN: CPT | Mod: PBBFAC | Performed by: STUDENT IN AN ORGANIZED HEALTH CARE EDUCATION/TRAINING PROGRAM

## 2022-10-13 PROCEDURE — 0502F SUBSEQUENT PRENATAL CARE: CPT | Mod: ,,, | Performed by: STUDENT IN AN ORGANIZED HEALTH CARE EDUCATION/TRAINING PROGRAM

## 2022-10-13 RX ORDER — OMEPRAZOLE 40 MG/1
40 CAPSULE, DELAYED RELEASE ORAL DAILY
Qty: 30 CAPSULE | Refills: 6 | Status: SHIPPED | OUTPATIENT
Start: 2022-10-13 | End: 2023-04-18 | Stop reason: SDUPTHER

## 2022-10-13 NOTE — PROGRESS NOTES
Subjective:      Esthela Robles is a 32 y.o. female being seen today for her obstetrical visit. She is at 20w3d gestation. Patient reports no bleeding, no contractions, no cramping, no leaking, and round ligament pain . Fetal movement: normal.    Menstrual History:  OB History          3    Para   1    Term   1            AB   1    Living   1         SAB   1    IAB        Ectopic        Multiple        Live Births   1               The following portions of the patient's history were reviewed and updated as appropriate: allergies, current medications, past family history, past medical history, past social history, past surgical history, and problem list.    Review of Systems  Pertinent items are noted in HPI.     Objective:      /78   Wt 98.4 kg (217 lb)   LMP 2022   BMI 35.02 kg/m²   FHT: 153/128 BPM   Uterine Size: 29 cm        Assessment:      Pregnancy 20 and 3/7 weeks     Plan:        Follow up in 4 weeks.

## 2022-10-19 ENCOUNTER — TELEPHONE (OUTPATIENT)
Dept: OBSTETRICS AND GYNECOLOGY | Facility: CLINIC | Age: 32
End: 2022-10-19
Payer: OTHER GOVERNMENT

## 2022-10-19 NOTE — TELEPHONE ENCOUNTER
----- Message from Chris Antoine sent at 10/19/2022  4:34 PM CDT -----  URGENT NEEDS TO BE SEEN TOMORROW SHE WANTS YOU TO CALL HER BACK TODAY  [phone]

## 2022-10-20 ENCOUNTER — ROUTINE PRENATAL (OUTPATIENT)
Dept: OBSTETRICS AND GYNECOLOGY | Facility: CLINIC | Age: 32
End: 2022-10-20
Payer: OTHER GOVERNMENT

## 2022-10-20 VITALS — BODY MASS INDEX: 34.54 KG/M2 | DIASTOLIC BLOOD PRESSURE: 78 MMHG | WEIGHT: 214 LBS | SYSTOLIC BLOOD PRESSURE: 124 MMHG

## 2022-10-20 DIAGNOSIS — O99.341 ANXIETY DURING PREGNANCY IN FIRST TRIMESTER, ANTEPARTUM: ICD-10-CM

## 2022-10-20 DIAGNOSIS — Z3A.21 21 WEEKS GESTATION OF PREGNANCY: ICD-10-CM

## 2022-10-20 DIAGNOSIS — O30.012 MONOAMNIOTIC-MONOCHORIONIC TWIN PREGNANCY IN SECOND TRIMESTER: Primary | ICD-10-CM

## 2022-10-20 DIAGNOSIS — R82.998 LEUKOCYTES IN URINE: ICD-10-CM

## 2022-10-20 DIAGNOSIS — F41.9 ANXIETY DURING PREGNANCY IN FIRST TRIMESTER, ANTEPARTUM: ICD-10-CM

## 2022-10-20 LAB
BILIRUB SERPL-MCNC: NORMAL MG/DL
BLOOD URINE, POC: NORMAL
COLOR, POC UA: NORMAL
GLUCOSE UR QL STRIP: NORMAL
KETONES UR QL STRIP: NORMAL
LEUKOCYTE ESTERASE URINE, POC: NORMAL
NITRITE, POC UA: NORMAL
PH, POC UA: 7
PROTEIN, POC: NORMAL
SPECIFIC GRAVITY, POC UA: 1.02
UROBILINOGEN, POC UA: 0.2

## 2022-10-20 PROCEDURE — 0502F SUBSEQUENT PRENATAL CARE: CPT | Mod: ,,, | Performed by: STUDENT IN AN ORGANIZED HEALTH CARE EDUCATION/TRAINING PROGRAM

## 2022-10-20 PROCEDURE — 87086 URINE CULTURE/COLONY COUNT: CPT | Mod: ,,, | Performed by: CLINICAL MEDICAL LABORATORY

## 2022-10-20 PROCEDURE — 87086 CULTURE, URINE: ICD-10-PCS | Mod: ,,, | Performed by: CLINICAL MEDICAL LABORATORY

## 2022-10-20 PROCEDURE — 0502F PR SUBSEQUENT PRENATAL CARE: ICD-10-PCS | Mod: ,,, | Performed by: STUDENT IN AN ORGANIZED HEALTH CARE EDUCATION/TRAINING PROGRAM

## 2022-10-20 PROCEDURE — 81003 URINALYSIS AUTO W/O SCOPE: CPT | Mod: PBBFAC | Performed by: STUDENT IN AN ORGANIZED HEALTH CARE EDUCATION/TRAINING PROGRAM

## 2022-10-20 PROCEDURE — 99213 OFFICE O/P EST LOW 20 MIN: CPT | Mod: PBBFAC | Performed by: STUDENT IN AN ORGANIZED HEALTH CARE EDUCATION/TRAINING PROGRAM

## 2022-10-20 NOTE — PROGRESS NOTES
Subjective:      Esthela Robles is a 32 y.o. female being seen today for her obstetrical visit. She is at 21w5d gestation. Patient reports no bleeding, no contractions, no cramping, and no leaking. Fetal movement: normal.    Reports mold in current living environment. Plans to stay with family in [place] until able to return.    Menstrual History:  OB History          3    Para   1    Term   1            AB   1    Living   1         SAB   1    IAB        Ectopic        Multiple        Live Births   1                The following portions of the patient's history were reviewed and updated as appropriate: allergies, current medications, past family history, past medical history, past social history, past surgical history, and problem list.    Review of Systems  Pertinent items are noted in HPI.     Objective:      /78   Wt 97.1 kg (214 lb)   LMP 2022   BMI 34.54 kg/m²   FHT: 144/148 BPM   Uterine Size: 31 cm        Assessment:      Pregnancy 21 and 5/7 weeks   Mono mono twin gestation    Plan:      Continue with MFM   Follow up in 2 weeks.

## 2022-10-22 LAB — UA COMPLETE W REFLEX CULTURE PNL UR: NORMAL

## 2022-10-25 NOTE — PROGRESS NOTES
Subjective:      Esthela Robles is a 32 y.o. female being seen today for her obstetrical visit. She is at 22w3d gestation. Patient reports no bleeding, no contractions, no cramping, and no leaking. Fetal movement:  too early .    Menstrual History:  OB History          3    Para   1    Term   1            AB   1    Living   1         SAB   1    IAB        Ectopic        Multiple        Live Births   1                     The following portions of the patient's history were reviewed and updated as appropriate: allergies, current medications, past family history, past medical history, past social history, past surgical history, and problem list.    Review of Systems  Pertinent items are noted in HPI.     Objective:      /78   Wt 97.1 kg (214 lb)   LMP 2022   BMI 34.54 kg/m²   FHT: 144/148 BPM   Uterine Size: 31 cm        Assessment:      Pregnancy 21 and 5/7 weeks     Plan:      OBGCT: discussed.  Signs and symptoms of  labor: discussed.  Follow up in 4 weeks.

## 2023-02-13 LAB
HUMAN PAPILLOMAVIRUS (HPV): NORMAL
PAP RECOMMENDATION EXT: NORMAL
PAP SMEAR: NORMAL

## 2023-04-18 ENCOUNTER — OFFICE VISIT (OUTPATIENT)
Dept: FAMILY MEDICINE | Facility: CLINIC | Age: 33
End: 2023-04-18
Payer: OTHER GOVERNMENT

## 2023-04-18 VITALS
RESPIRATION RATE: 16 BRPM | OXYGEN SATURATION: 98 % | TEMPERATURE: 97 F | HEART RATE: 91 BPM | HEIGHT: 66 IN | BODY MASS INDEX: 30.22 KG/M2 | DIASTOLIC BLOOD PRESSURE: 78 MMHG | SYSTOLIC BLOOD PRESSURE: 126 MMHG | WEIGHT: 188 LBS

## 2023-04-18 DIAGNOSIS — F32.2 SEVERE DEPRESSION: ICD-10-CM

## 2023-04-18 DIAGNOSIS — N91.2 AMENORRHEA: ICD-10-CM

## 2023-04-18 DIAGNOSIS — F31.9 BIPOLAR DEPRESSION: Primary | ICD-10-CM

## 2023-04-18 PROCEDURE — 99213 OFFICE O/P EST LOW 20 MIN: CPT | Mod: ,,, | Performed by: NURSE PRACTITIONER

## 2023-04-18 PROCEDURE — 99213 PR OFFICE/OUTPT VISIT, EST, LEVL III, 20-29 MIN: ICD-10-PCS | Mod: ,,, | Performed by: NURSE PRACTITIONER

## 2023-04-18 RX ORDER — HYDROXYZINE PAMOATE 25 MG/1
25 CAPSULE ORAL EVERY 6 HOURS PRN
Qty: 60 CAPSULE | Refills: 1 | Status: SHIPPED | OUTPATIENT
Start: 2023-04-18 | End: 2023-11-15

## 2023-04-18 RX ORDER — TOPIRAMATE 50 MG/1
50 TABLET, FILM COATED ORAL 2 TIMES DAILY
Qty: 60 TABLET | Refills: 11 | Status: SHIPPED | OUTPATIENT
Start: 2023-04-18 | End: 2023-11-15

## 2023-04-18 RX ORDER — OMEPRAZOLE 40 MG/1
40 CAPSULE, DELAYED RELEASE ORAL DAILY
Qty: 90 CAPSULE | Refills: 1 | Status: SHIPPED | OUTPATIENT
Start: 2023-04-18 | End: 2023-11-15

## 2023-04-18 RX ORDER — FLUOXETINE HYDROCHLORIDE 20 MG/1
20 CAPSULE ORAL DAILY
Qty: 30 CAPSULE | Refills: 11 | Status: SHIPPED | OUTPATIENT
Start: 2023-04-18 | End: 2023-06-12

## 2023-04-18 NOTE — PROGRESS NOTES
BALTAZAR Medina   [facility] [address] [place] [address]  [phone]      PATIENT NAME: Esthela Robles  : 1990  DATE: 23  MRN: [id number]      Billing Provider: BALTAZAR Medina  Level of Service:   Patient PCP Information       Provider PCP Type    Amrik Haq DO General            Reason for Visit / Chief Complaint: Contraception       Update PCP  Update Chief Complaint         History of Present Illness / Problem Focused Workflow     Esthela Robles presents to the clinic with Contraception     Pt presents for evaluation of anxiety/depression. She recently had twin boys and has moved back. She was started on zoloft after delivery but reports it is not helping. She is requesting to resume meds prior to pregnancy. Meds reviewed.    She is also concerned she has not had a menstrual cycle following delivery and is requesting a GYN referral. Will refer back to Dr Craven.       Review of Systems     Review of Systems   Constitutional:  Negative for fatigue and fever.   HENT:  Negative for nasal congestion and sore throat.    Eyes:  Negative for visual disturbance.   Respiratory:  Negative for chest tightness and shortness of breath.    Cardiovascular:  Negative for chest pain and leg swelling.   Gastrointestinal:  Negative for abdominal pain, change in bowel habit and change in bowel habit.   Endocrine: Negative for polydipsia, polyphagia and polyuria.   Genitourinary:  Negative for dysuria and hematuria.   Musculoskeletal:  Negative for back pain and leg pain.   Integumentary:  Negative for rash.   Neurological:  Negative for dizziness, syncope, weakness and light-headedness.   Psychiatric/Behavioral:  The patient is nervous/anxious.       Medical / Social / Family History     Past Medical History:   Diagnosis Date    Depression        Past Surgical History:   Procedure Laterality Date     CHOLECYSTECTOMY  2013       Social History  Ms. Robles  reports that she has been smoking cigarettes. She has never used smokeless tobacco. She reports that she does not drink alcohol and does not use drugs.    Family History  Ms. Robles's family history includes Diabetes in her maternal grandmother and paternal grandfather; No Known Problems in her father and mother.    Medications and Allergies     Medications  Outpatient Medications Marked as Taking for the 4/18/23 encounter (Office Visit) with BALTAZAR Medina   Medication Sig Dispense Refill    [DISCONTINUED] omeprazole (PRILOSEC) 40 MG capsule Take 1 capsule (40 mg total) by mouth once daily. 30 capsule 6    [DISCONTINUED] sertraline (ZOLOFT) 25 MG tablet Take 1 tablet (25 mg total) by mouth once daily. 30 tablet 11       Allergies  Review of patient's allergies indicates:   Allergen Reactions    Codeine Nausea And Vomiting    Hazelnut        Physical Examination     Vitals:    04/18/23 1417   BP: 126/78   Pulse: 91   Resp: 16   Temp: 97.4 °F (36.3 °C)     Physical Exam  Eyes:      Pupils: Pupils are equal, round, and reactive to light.   Cardiovascular:      Rate and Rhythm: Normal rate and regular rhythm.      Heart sounds: Normal heart sounds. No murmur heard.  Pulmonary:      Breath sounds: Normal breath sounds. No wheezing, rhonchi or rales.   Abdominal:      General: Bowel sounds are normal.   Musculoskeletal:         General: No swelling.      Cervical back: Normal range of motion and neck supple.   Skin:     General: Skin is warm and dry.   Neurological:      Mental Status: She is alert and oriented to person, place, and time.        Assessment and Plan (including Health Maintenance)      Problem List  Smart Sets  Document Outside HM   :    Plan:   1. Bipolar depression  Comments:  restart home meds  follow up 1 month     2. Amenorrhea  Comments:  refer GYN  Orders:  -     Ambulatory referral/consult to Gynecology; Future; Expected date:  04/25/2023    3. Severe depression    Other orders  -     hydrOXYzine pamoate (VISTARIL) 25 MG Cap; Take 1 capsule (25 mg total) by mouth every 6 (six) hours as needed (anxiety).  Dispense: 60 capsule; Refill: 1  -     topiramate (TOPAMAX) 50 MG tablet; Take 1 tablet (50 mg total) by mouth 2 (two) times daily.  Dispense: 60 tablet; Refill: 11  -     FLUoxetine 20 MG capsule; Take 1 capsule (20 mg total) by mouth once daily.  Dispense: 30 capsule; Refill: 11  -     omeprazole (PRILOSEC) 40 MG capsule; Take 1 capsule (40 mg total) by mouth once daily.  Dispense: 90 capsule; Refill: 1           Health Maintenance Due   Topic Date Due    Cervical Cancer Screening  Never done    Hemoglobin A1c (Prediabetes)  Never done    COVID-19 Vaccine (1) Never done    Pneumococcal Vaccines (Age 0-64) (1 - PCV) Never done    TETANUS VACCINE  Never done       Problem List Items Addressed This Visit          Psychiatric    Bipolar depression - Primary    Severe depression     Other Visit Diagnoses       Amenorrhea        refer GYN    Relevant Orders    Ambulatory referral/consult to Gynecology            The patient has no Health Maintenance topics of status Not Due    Future Appointments   Date Time Provider Department Center   5/18/2023  1:20 PM BALTAZAR Medina [facility] CARLOTA Swain            Signature:  BALTAZAR Medina [facility] [address] [place] [address]  [phone]    Date of encounter: 4/18/23

## 2023-04-20 ENCOUNTER — PATIENT MESSAGE (OUTPATIENT)
Dept: FAMILY MEDICINE | Facility: CLINIC | Age: 33
End: 2023-04-20
Payer: OTHER GOVERNMENT

## 2023-06-12 ENCOUNTER — OFFICE VISIT (OUTPATIENT)
Dept: FAMILY MEDICINE | Facility: CLINIC | Age: 33
End: 2023-06-12
Payer: OTHER GOVERNMENT

## 2023-06-12 VITALS
RESPIRATION RATE: 16 BRPM | BODY MASS INDEX: 29.89 KG/M2 | HEART RATE: 80 BPM | DIASTOLIC BLOOD PRESSURE: 71 MMHG | SYSTOLIC BLOOD PRESSURE: 112 MMHG | HEIGHT: 66 IN | TEMPERATURE: 98 F | WEIGHT: 186 LBS | OXYGEN SATURATION: 98 %

## 2023-06-12 DIAGNOSIS — Z00.00 ROUTINE GENERAL MEDICAL EXAMINATION AT A HEALTH CARE FACILITY: ICD-10-CM

## 2023-06-12 DIAGNOSIS — R30.0 DYSURIA: ICD-10-CM

## 2023-06-12 DIAGNOSIS — N76.0 ACUTE VAGINITIS: ICD-10-CM

## 2023-06-12 DIAGNOSIS — R73.01 IMPAIRED FASTING BLOOD SUGAR: ICD-10-CM

## 2023-06-12 DIAGNOSIS — F31.9 BIPOLAR DEPRESSION: Primary | ICD-10-CM

## 2023-06-12 DIAGNOSIS — R73.01 IFG (IMPAIRED FASTING GLUCOSE): ICD-10-CM

## 2023-06-12 PROBLEM — F41.0 PANIC ATTACKS: Status: RESOLVED | Noted: 2022-05-13 | Resolved: 2023-06-12

## 2023-06-12 PROBLEM — Z3A.08 8 WEEKS GESTATION OF PREGNANCY: Status: RESOLVED | Noted: 2022-07-18 | Resolved: 2023-06-12

## 2023-06-12 PROBLEM — Z3A.01 5 WEEKS GESTATION OF PREGNANCY: Status: RESOLVED | Noted: 2022-06-27 | Resolved: 2023-06-12

## 2023-06-12 PROBLEM — O21.9 NAUSEA/VOMITING IN PREGNANCY: Status: RESOLVED | Noted: 2022-08-19 | Resolved: 2023-06-12

## 2023-06-12 PROBLEM — R05.9 COUGH: Status: RESOLVED | Noted: 2022-09-29 | Resolved: 2023-06-12

## 2023-06-12 PROBLEM — F32.2 SEVERE DEPRESSION: Status: RESOLVED | Noted: 2022-05-13 | Resolved: 2023-06-12

## 2023-06-12 PROBLEM — J32.9 SINUSITIS: Status: RESOLVED | Noted: 2022-09-29 | Resolved: 2023-06-12

## 2023-06-12 LAB
BILIRUB SERPL-MCNC: NORMAL MG/DL
BLOOD URINE, POC: NORMAL
COLOR, POC UA: YELLOW
GLUCOSE UR QL STRIP: NORMAL
KETONES UR QL STRIP: NORMAL
LEUKOCYTE ESTERASE URINE, POC: NORMAL
NITRITE, POC UA: NORMAL
PH, POC UA: 7
PROTEIN, POC: NORMAL
SPECIFIC GRAVITY, POC UA: 1.02
UROBILINOGEN, POC UA: 0.2

## 2023-06-12 PROCEDURE — 82570 ASSAY OF URINE CREATININE: CPT | Mod: ,,, | Performed by: CLINICAL MEDICAL LABORATORY

## 2023-06-12 PROCEDURE — 87077 CULTURE, URINE: ICD-10-PCS | Mod: ,,, | Performed by: CLINICAL MEDICAL LABORATORY

## 2023-06-12 PROCEDURE — 87591 CHLAMYDIA/GONORRHOEAE(GC), PCR: ICD-10-PCS | Mod: ,,, | Performed by: CLINICAL MEDICAL LABORATORY

## 2023-06-12 PROCEDURE — 80053 COMPREHENSIVE METABOLIC PANEL: ICD-10-PCS | Mod: ,,, | Performed by: CLINICAL MEDICAL LABORATORY

## 2023-06-12 PROCEDURE — 81003 POCT URINALYSIS W/O SCOPE: ICD-10-PCS | Mod: QW,,, | Performed by: NURSE PRACTITIONER

## 2023-06-12 PROCEDURE — 83036 HEMOGLOBIN A1C: ICD-10-PCS | Mod: ,,, | Performed by: CLINICAL MEDICAL LABORATORY

## 2023-06-12 PROCEDURE — 87591 N.GONORRHOEAE DNA AMP PROB: CPT | Mod: ,,, | Performed by: CLINICAL MEDICAL LABORATORY

## 2023-06-12 PROCEDURE — 80061 LIPID PANEL: ICD-10-PCS | Mod: ,,, | Performed by: CLINICAL MEDICAL LABORATORY

## 2023-06-12 PROCEDURE — 80053 COMPREHEN METABOLIC PANEL: CPT | Mod: ,,, | Performed by: CLINICAL MEDICAL LABORATORY

## 2023-06-12 PROCEDURE — 87661 TRICHOMONAS VAGINALIS AMPLIF: CPT | Mod: ,,, | Performed by: CLINICAL MEDICAL LABORATORY

## 2023-06-12 PROCEDURE — 87086 URINE CULTURE/COLONY COUNT: CPT | Mod: ,,, | Performed by: CLINICAL MEDICAL LABORATORY

## 2023-06-12 PROCEDURE — 85025 CBC WITH DIFFERENTIAL: ICD-10-PCS | Mod: ,,, | Performed by: CLINICAL MEDICAL LABORATORY

## 2023-06-12 PROCEDURE — 99213 PR OFFICE/OUTPT VISIT, EST, LEVL III, 20-29 MIN: ICD-10-PCS | Mod: ,,, | Performed by: NURSE PRACTITIONER

## 2023-06-12 PROCEDURE — 87491 CHLAMYDIA/GONORRHOEAE(GC), PCR: ICD-10-PCS | Mod: ,,, | Performed by: CLINICAL MEDICAL LABORATORY

## 2023-06-12 PROCEDURE — 87077 CULTURE AEROBIC IDENTIFY: CPT | Mod: ,,, | Performed by: CLINICAL MEDICAL LABORATORY

## 2023-06-12 PROCEDURE — 87491 CHLMYD TRACH DNA AMP PROBE: CPT | Mod: ,,, | Performed by: CLINICAL MEDICAL LABORATORY

## 2023-06-12 PROCEDURE — 82043 MICROALBUMIN / CREATININE RATIO URINE: ICD-10-PCS | Mod: ,,, | Performed by: CLINICAL MEDICAL LABORATORY

## 2023-06-12 PROCEDURE — 87086 CULTURE, URINE: ICD-10-PCS | Mod: ,,, | Performed by: CLINICAL MEDICAL LABORATORY

## 2023-06-12 PROCEDURE — 81003 URINALYSIS AUTO W/O SCOPE: CPT | Mod: QW,,, | Performed by: NURSE PRACTITIONER

## 2023-06-12 PROCEDURE — 87661 TRICHOMONAS VAGINALIS BY PCR: ICD-10-PCS | Mod: ,,, | Performed by: CLINICAL MEDICAL LABORATORY

## 2023-06-12 PROCEDURE — 87210 SMEAR WET MOUNT SALINE/INK: CPT | Mod: 59,,, | Performed by: CLINICAL MEDICAL LABORATORY

## 2023-06-12 PROCEDURE — 80061 LIPID PANEL: CPT | Mod: ,,, | Performed by: CLINICAL MEDICAL LABORATORY

## 2023-06-12 PROCEDURE — 87186 CULTURE, URINE: ICD-10-PCS | Mod: XU,,, | Performed by: CLINICAL MEDICAL LABORATORY

## 2023-06-12 PROCEDURE — 82570 MICROALBUMIN / CREATININE RATIO URINE: ICD-10-PCS | Mod: ,,, | Performed by: CLINICAL MEDICAL LABORATORY

## 2023-06-12 PROCEDURE — 99213 OFFICE O/P EST LOW 20 MIN: CPT | Mod: ,,, | Performed by: NURSE PRACTITIONER

## 2023-06-12 PROCEDURE — 83036 HEMOGLOBIN GLYCOSYLATED A1C: CPT | Mod: ,,, | Performed by: CLINICAL MEDICAL LABORATORY

## 2023-06-12 PROCEDURE — 87186 SC STD MICRODIL/AGAR DIL: CPT | Mod: XU,,, | Performed by: CLINICAL MEDICAL LABORATORY

## 2023-06-12 PROCEDURE — 85025 COMPLETE CBC W/AUTO DIFF WBC: CPT | Mod: ,,, | Performed by: CLINICAL MEDICAL LABORATORY

## 2023-06-12 PROCEDURE — 82043 UR ALBUMIN QUANTITATIVE: CPT | Mod: ,,, | Performed by: CLINICAL MEDICAL LABORATORY

## 2023-06-12 PROCEDURE — 87210 WET PREP, GENITAL: ICD-10-PCS | Mod: 59,,, | Performed by: CLINICAL MEDICAL LABORATORY

## 2023-06-12 RX ORDER — FLUCONAZOLE 100 MG/1
100 TABLET ORAL DAILY
Qty: 5 TABLET | Refills: 0 | Status: SHIPPED | OUTPATIENT
Start: 2023-06-12 | End: 2023-06-17

## 2023-06-12 RX ORDER — FLUOXETINE HYDROCHLORIDE 40 MG/1
40 CAPSULE ORAL DAILY
Qty: 90 CAPSULE | Refills: 1 | Status: SHIPPED | OUTPATIENT
Start: 2023-06-12 | End: 2023-11-15

## 2023-06-12 NOTE — PROGRESS NOTES
BALTAZAR Medina   [facility] [address] [place] [address]  [phone]      PATIENT NAME: Esthela Robles  : 1990  DATE: 23  MRN: [id number]      Billing Provider: BALTAZAR Medina  Level of Service:   Patient PCP Information       Provider PCP Type    Amrik Haq DO General            Reason for Visit / Chief Complaint: Vaginitis       Update PCP  Update Chief Complaint         History of Present Illness / Problem Focused Workflow     Esthela Robles presents to the clinic with Vaginitis     Patient presents for routine follow-up.  She is a history of bipolar disorder and anxiety with depression.  She was recently restarted on her Prozac.  She reports it has helped a little bit habitus requesting an increase in her medications.  We will increase Prozac from 20 mg daily to 40 mg daily.  Discussed possible side effects including drowsiness or sleepiness.     Patient reports overall she is doing well she does have added stress and anxiety related to having twins.  No thoughts of self-harm or harm to others.  We will obtain routine labs today.    Patient has complaints of vaginal itching and mild discharge.  She was recently on antibiotics for urinary tract infection.  She states she believes she is a yeast infection related to antibiotic therapy.  We will send swabs for culture.  Treat with Diflucan for now.      Review of Systems     Review of Systems   Constitutional:  Negative for fatigue and fever.   HENT:  Negative for nasal congestion and sore throat.    Eyes:  Negative for visual disturbance.   Respiratory:  Negative for chest tightness and shortness of breath.    Cardiovascular:  Negative for chest pain and leg swelling.   Gastrointestinal:  Negative for abdominal pain, change in bowel habit and change in bowel habit.   Endocrine: Negative for polydipsia, polyphagia and polyuria.    Genitourinary:  Positive for vaginal discharge. Negative for dysuria and hematuria.   Musculoskeletal:  Negative for back pain and leg pain.   Integumentary:  Negative for rash.   Neurological:  Negative for dizziness, syncope, weakness and light-headedness.      Medical / Social / Family History     Past Medical History:   Diagnosis Date    Depression        Past Surgical History:   Procedure Laterality Date    CHOLECYSTECTOMY  2013       Social History  Ms. Robles  reports that she has been smoking cigarettes. She has never used smokeless tobacco. She reports that she does not drink alcohol and does not use drugs.    Family History  Ms. Robles's family history includes Diabetes in her maternal grandmother and paternal grandfather; No Known Problems in her father and mother.    Medications and Allergies     Medications  Outpatient Medications Marked as Taking for the 6/12/23 encounter (Office Visit) with BALTAZAR Medina   Medication Sig Dispense Refill    fluticasone propionate (FLONASE) 50 mcg/actuation nasal spray 1 spray (50 mcg total) by Each Nostril route once daily. 11.1 mL 0    hydrOXYzine pamoate (VISTARIL) 25 MG Cap Take 1 capsule (25 mg total) by mouth every 6 (six) hours as needed (anxiety). 60 capsule 1    omeprazole (PRILOSEC) 40 MG capsule Take 1 capsule (40 mg total) by mouth once daily. 90 capsule 1    topiramate (TOPAMAX) 50 MG tablet Take 1 tablet (50 mg total) by mouth 2 (two) times daily. 60 tablet 11       Allergies  Review of patient's allergies indicates:   Allergen Reactions    Codeine Nausea And Vomiting    Hazelnut        Physical Examination     Vitals:    06/12/23 1522   BP: 112/71   Pulse: 80   Resp: 16   Temp: 97.5 °F (36.4 °C)     Physical Exam  Eyes:      Pupils: Pupils are equal, round, and reactive to light.   Cardiovascular:      Rate and Rhythm: Normal rate and regular rhythm.      Heart sounds: Normal heart sounds. No murmur heard.  Pulmonary:      Breath sounds: Normal  breath sounds. No wheezing, rhonchi or rales.   Abdominal:      General: Bowel sounds are normal.   Musculoskeletal:         General: No swelling.      Cervical back: Normal range of motion and neck supple.   Skin:     General: Skin is warm and dry.   Neurological:      Mental Status: She is alert and oriented to person, place, and time.        Lab Results   Component Value Date    WBC 9.45 07/18/2022    HGB 14.4 07/18/2022    HCT 42.8 07/18/2022    MCV 93.7 07/18/2022     07/18/2022        Sodium   Date Value Ref Range Status   07/18/2022 140 136 - 145 mmol/L Final     Potassium   Date Value Ref Range Status   07/18/2022 4.2 3.5 - 5.1 mmol/L Final     Chloride   Date Value Ref Range Status   07/18/2022 108 (H) 98 - 107 mmol/L Final     CO2   Date Value Ref Range Status   07/18/2022 24 21 - 32 mmol/L Final     Glucose   Date Value Ref Range Status   07/18/2022 60 (L) 74 - 106 mg/dL Final     BUN   Date Value Ref Range Status   07/18/2022 5 (L) 7 - 18 mg/dL Final     Creatinine   Date Value Ref Range Status   07/18/2022 0.60 0.55 - 1.02 mg/dL Final     Calcium   Date Value Ref Range Status   07/18/2022 8.8 8.5 - 10.1 mg/dL Final     Total Protein   Date Value Ref Range Status   06/27/2022 7.1 6.4 - 8.2 g/dL Final     Albumin   Date Value Ref Range Status   06/27/2022 3.7 3.5 - 5.0 g/dL Final     Bilirubin, Total   Date Value Ref Range Status   06/27/2022 0.4 0.0 - 1.2 mg/dL Final     Alk Phos   Date Value Ref Range Status   06/27/2022 65 37 - 98 U/L Final     AST   Date Value Ref Range Status   06/27/2022 48 (H) 15 - 37 U/L Final     ALT   Date Value Ref Range Status   06/27/2022 46 13 - 56 U/L Final     Anion Gap   Date Value Ref Range Status   07/18/2022 12 7 - 16 mmol/L Final      Lab Results   Component Value Date    HGBA1C 5.0 03/28/2022      Lab Results   Component Value Date    CHOL 192 03/28/2022     Lab Results   Component Value Date    HDL 52 03/28/2022     Lab Results   Component Value Date     LDLCALC 111 03/28/2022     No results found for: DLDL  Lab Results   Component Value Date    TRIG 145 03/28/2022     Lab Results   Component Value Date    CHOLHDL 3.7 03/28/2022      Lab Results   Component Value Date    TSH 2.540 03/28/2022        Assessment and Plan (including Health Maintenance)      Problem List  Smart Sets  Document Outside HM   :    Plan:     1. Bipolar depression  Comments:  Refill home meds.  Overall doing well.    2. Impaired fasting blood sugar    3. Routine general medical examination at a health care facility  -     CBC Auto Differential; Future; Expected date: 06/12/2023  -     Comprehensive Metabolic Panel; Future; Expected date: 06/12/2023  -     Lipid Panel; Future; Expected date: 06/12/2023  -     Microalbumin/Creatinine Ratio, Urine; Future; Expected date: 06/12/2023    4. IFG (impaired fasting glucose)  -     Hemoglobin A1C; Future; Expected date: 06/12/2023    5. Acute vaginitis  Comments:  Sent for cultures.  Orders:  -     POCT URINALYSIS W/O SCOPE  -     Chlamydia/GC, PCR  -     Trichomonas vaginalis by PCR; Future; Expected date: 06/12/2023  -     Wet Prep, Genital; Future; Expected date: 06/12/2023    6. Dysuria  -     Urine culture; Future; Expected date: 06/12/2023    Other orders  -     fluconazole (DIFLUCAN) 100 MG tablet; Take 1 tablet (100 mg total) by mouth once daily. for 5 days  Dispense: 5 tablet; Refill: 0  -     FLUoxetine 40 MG capsule; Take 1 capsule (40 mg total) by mouth once daily.  Dispense: 90 capsule; Refill: 1         There are no Patient Instructions on file for this visit.     Health Maintenance Due   Topic Date Due    Cervical Cancer Screening  Never done    Pneumococcal Vaccines (Age 0-64) (1 - PCV) Never done    TETANUS VACCINE  Never done    Hemoglobin A1c  09/28/2022    Diabetes Urine Screening  03/28/2023         The patient has no Health Maintenance topics of status Not Due    Future Appointments   Date Time Provider Department Center    12/12/2023  3:20 PM BALTAZAR Medina [facility] CARLOTA Simpsoni            Signature:  BALTAZAR Medina [facility] [address] [place] [address]  [phone]    Date of encounter: 6/12/23

## 2023-06-13 LAB
ALBUMIN SERPL BCP-MCNC: 4.1 G/DL (ref 3.5–5)
ALBUMIN/GLOB SERPL: 1.1 {RATIO}
ALP SERPL-CCNC: 88 U/L (ref 37–98)
ALT SERPL W P-5'-P-CCNC: 22 U/L (ref 13–56)
ANION GAP SERPL CALCULATED.3IONS-SCNC: 7 MMOL/L (ref 7–16)
AST SERPL W P-5'-P-CCNC: 23 U/L (ref 15–37)
BACTERIA VAG QL WET PREP: ABNORMAL /HPF
BASOPHILS # BLD AUTO: 0.12 K/UL (ref 0–0.2)
BASOPHILS NFR BLD AUTO: 1.4 % (ref 0–1)
BILIRUB SERPL-MCNC: 0.3 MG/DL (ref ?–1.2)
BUN SERPL-MCNC: 10 MG/DL (ref 7–18)
BUN/CREAT SERPL: 13 (ref 6–20)
CALCIUM SERPL-MCNC: 9 MG/DL (ref 8.5–10.1)
CHLORIDE SERPL-SCNC: 110 MMOL/L (ref 98–107)
CHOLEST SERPL-MCNC: 232 MG/DL (ref 0–200)
CHOLEST/HDLC SERPL: 3.9 {RATIO}
CLUE CELLS VAG QL WET PREP: ABNORMAL /HPF
CO2 SERPL-SCNC: 23 MMOL/L (ref 21–32)
CREAT SERPL-MCNC: 0.78 MG/DL (ref 0.55–1.02)
DIFFERENTIAL METHOD BLD: ABNORMAL
EGFR (NO RACE VARIABLE) (RUSH/TITUS): 103 ML/MIN/1.73M2
EOSINOPHIL # BLD AUTO: 0.26 K/UL (ref 0–0.5)
EOSINOPHIL NFR BLD AUTO: 3 % (ref 1–4)
ERYTHROCYTE [DISTWIDTH] IN BLOOD BY AUTOMATED COUNT: 15.9 % (ref 11.5–14.5)
EST. AVERAGE GLUCOSE BLD GHB EST-MCNC: 74 MG/DL
GLOBULIN SER-MCNC: 3.7 G/DL (ref 2–4)
GLUCOSE SERPL-MCNC: 72 MG/DL (ref 74–106)
HBA1C MFR BLD HPLC: 4.8 % (ref 4.5–6.6)
HCT VFR BLD AUTO: 43.6 % (ref 38–47)
HDLC SERPL-MCNC: 60 MG/DL (ref 40–60)
HGB BLD-MCNC: 13.8 G/DL (ref 12–16)
IMM GRANULOCYTES # BLD AUTO: 0.01 K/UL (ref 0–0.04)
IMM GRANULOCYTES NFR BLD: 0.1 % (ref 0–0.4)
LDLC SERPL CALC-MCNC: 134 MG/DL
LDLC/HDLC SERPL: 2.2 {RATIO}
LYMPHOCYTES # BLD AUTO: 3.54 K/UL (ref 1–4.8)
LYMPHOCYTES NFR BLD AUTO: 41.4 % (ref 27–41)
MCH RBC QN AUTO: 28.5 PG (ref 27–31)
MCHC RBC AUTO-ENTMCNC: 31.7 G/DL (ref 32–36)
MCV RBC AUTO: 89.9 FL (ref 80–96)
MONOCYTES # BLD AUTO: 0.88 K/UL (ref 0–0.8)
MONOCYTES NFR BLD AUTO: 10.3 % (ref 2–6)
MPC BLD CALC-MCNC: 11.3 FL (ref 9.4–12.4)
NEUTROPHILS # BLD AUTO: 3.74 K/UL (ref 1.8–7.7)
NEUTROPHILS NFR BLD AUTO: 43.8 % (ref 53–65)
NONHDLC SERPL-MCNC: 172 MG/DL
NRBC # BLD AUTO: 0 X10E3/UL
NRBC, AUTO (.00): 0 %
PLATELET # BLD AUTO: 305 K/UL (ref 150–400)
POTASSIUM SERPL-SCNC: 4.4 MMOL/L (ref 3.5–5.1)
PROT SERPL-MCNC: 7.8 G/DL (ref 6.4–8.2)
RBC # BLD AUTO: 4.85 M/UL (ref 4.2–5.4)
RBC #/AREA VAG WET PREP: ABNORMAL /HPF
SODIUM SERPL-SCNC: 136 MMOL/L (ref 136–145)
SQUAMOUS EPITHELIALS WET WOUNT, GENITAL: ABNORMAL /HPF
T VAGINALIS VAG QL WET PREP: ABNORMAL /HPF
TRIGL SERPL-MCNC: 190 MG/DL (ref 35–150)
VLDLC SERPL-MCNC: 38 MG/DL
WBC # BLD AUTO: 8.55 K/UL (ref 4.5–11)
WBC CLUMPS WET MOUNT, GENITAL: ABNORMAL /HPF
WBC VAG QL WET PREP: ABNORMAL /HPF
YEAST VAG QL WET PREP: ABNORMAL /HPF

## 2023-06-14 LAB
CHLAMYDIA BY PCR: NEGATIVE
CREAT UR-MCNC: 123 MG/DL (ref 28–219)
MICROALBUMIN UR-MCNC: 1.3 MG/DL (ref 0–2.8)
MICROALBUMIN/CREAT RATIO PNL UR: 10.6 MG/G (ref 0–30)
N. GONORRHOEAE (GC) BY PCR: NEGATIVE
TRICHOMONAS NAT: NEGATIVE

## 2023-06-17 LAB
UA COMPLETE W REFLEX CULTURE PNL UR: ABNORMAL
UA COMPLETE W REFLEX CULTURE PNL UR: ABNORMAL

## 2023-06-19 ENCOUNTER — PATIENT MESSAGE (OUTPATIENT)
Dept: FAMILY MEDICINE | Facility: CLINIC | Age: 33
End: 2023-06-19
Payer: OTHER GOVERNMENT

## 2023-07-13 ENCOUNTER — PATIENT MESSAGE (OUTPATIENT)
Dept: FAMILY MEDICINE | Facility: CLINIC | Age: 33
End: 2023-07-13
Payer: OTHER GOVERNMENT

## 2023-07-14 RX ORDER — FLUCONAZOLE 100 MG/1
100 TABLET ORAL DAILY
Qty: 5 TABLET | Refills: 0 | Status: SHIPPED | OUTPATIENT
Start: 2023-07-14 | End: 2023-07-19

## 2023-09-11 ENCOUNTER — OFFICE VISIT (OUTPATIENT)
Dept: FAMILY MEDICINE | Facility: CLINIC | Age: 33
End: 2023-09-11
Payer: OTHER GOVERNMENT

## 2023-09-11 VITALS
RESPIRATION RATE: 16 BRPM | WEIGHT: 188 LBS | SYSTOLIC BLOOD PRESSURE: 113 MMHG | HEART RATE: 85 BPM | TEMPERATURE: 98 F | OXYGEN SATURATION: 98 % | BODY MASS INDEX: 30.22 KG/M2 | DIASTOLIC BLOOD PRESSURE: 73 MMHG | HEIGHT: 66 IN

## 2023-09-11 DIAGNOSIS — J00 ACUTE NASOPHARYNGITIS: Primary | ICD-10-CM

## 2023-09-11 PROCEDURE — 99212 PR OFFICE/OUTPT VISIT, EST, LEVL II, 10-19 MIN: ICD-10-PCS | Mod: 25,,, | Performed by: NURSE PRACTITIONER

## 2023-09-11 PROCEDURE — 99212 OFFICE O/P EST SF 10 MIN: CPT | Mod: 25,,, | Performed by: NURSE PRACTITIONER

## 2023-09-11 PROCEDURE — 96372 PR INJECTION,THERAP/PROPH/DIAG2ST, IM OR SUBCUT: ICD-10-PCS | Mod: ,,, | Performed by: NURSE PRACTITIONER

## 2023-09-11 PROCEDURE — 96372 THER/PROPH/DIAG INJ SC/IM: CPT | Mod: ,,, | Performed by: NURSE PRACTITIONER

## 2023-09-11 RX ORDER — FLUTICASONE PROPIONATE 50 MCG
1 SPRAY, SUSPENSION (ML) NASAL DAILY
Qty: 11.1 ML | Refills: 0 | Status: SHIPPED | OUTPATIENT
Start: 2023-09-11 | End: 2023-11-15

## 2023-09-11 RX ORDER — AMOXICILLIN AND CLAVULANATE POTASSIUM 875; 125 MG/1; MG/1
1 TABLET, FILM COATED ORAL EVERY 12 HOURS
COMMUNITY
Start: 2023-06-02 | End: 2024-03-11

## 2023-09-11 RX ORDER — BETAMETHASONE SODIUM PHOSPHATE AND BETAMETHASONE ACETATE 3; 3 MG/ML; MG/ML
6 INJECTION, SUSPENSION INTRA-ARTICULAR; INTRALESIONAL; INTRAMUSCULAR; SOFT TISSUE
Status: COMPLETED | OUTPATIENT
Start: 2023-09-11 | End: 2023-09-11

## 2023-09-11 RX ORDER — CEFTRIAXONE 1 G/1
1 INJECTION, POWDER, FOR SOLUTION INTRAMUSCULAR; INTRAVENOUS
Status: COMPLETED | OUTPATIENT
Start: 2023-09-11 | End: 2023-09-11

## 2023-09-11 RX ORDER — FLUCONAZOLE 100 MG/1
100 TABLET ORAL DAILY
Qty: 5 TABLET | Refills: 0 | Status: SHIPPED | OUTPATIENT
Start: 2023-09-11 | End: 2023-09-16

## 2023-09-11 RX ORDER — CEFDINIR 300 MG/1
300 CAPSULE ORAL 2 TIMES DAILY
Qty: 20 CAPSULE | Refills: 0 | Status: SHIPPED | OUTPATIENT
Start: 2023-09-11 | End: 2023-09-21

## 2023-09-11 RX ADMIN — BETAMETHASONE SODIUM PHOSPHATE AND BETAMETHASONE ACETATE 6 MG: 3; 3 INJECTION, SUSPENSION INTRA-ARTICULAR; INTRALESIONAL; INTRAMUSCULAR; SOFT TISSUE at 01:09

## 2023-09-11 RX ADMIN — CEFTRIAXONE 1 G: 1 INJECTION, POWDER, FOR SOLUTION INTRAMUSCULAR; INTRAVENOUS at 01:09

## 2023-09-11 NOTE — PROGRESS NOTES
BALTAZAR Medina   [facility] [address] [place] [address]  [phone]      PATIENT NAME: Esthela Robles  : 1990  DATE: 23  MRN: [id number]      Billing Provider: BALTAZAR Medina  Level of Service:   Patient PCP Information       Provider PCP Type    Amrik Haq DO General            Reason for Visit / Chief Complaint: Dizziness, Otalgia, and Nausea       Update PCP  Update Chief Complaint         History of Present Illness / Problem Focused Workflow     Esthela Robles presents to the clinic with Dizziness, Otalgia, and Nausea     Pt presents with sinus congestion, pressure and cough. No fever, chills or bodyaches. No loss of taste or smell.     Recommend change toothbrush and bed linens  Avoid sharing food or drinks  Encourage oral hydration.           Review of Systems     Review of Systems   Constitutional:  Negative for chills, fatigue and fever.   HENT:  Positive for nasal congestion, postnasal drip and sinus pressure/congestion. Negative for sore throat.    Respiratory:  Negative for cough, shortness of breath and wheezing.    Cardiovascular:  Negative for chest pain.   Gastrointestinal:  Negative for nausea and vomiting.   Neurological:  Negative for weakness and headaches.        Medical / Social / Family History     Past Medical History:   Diagnosis Date    Depression        Past Surgical History:   Procedure Laterality Date    CHOLECYSTECTOMY         Social History  Ms. Robles  reports that she has been smoking cigarettes. She has never used smokeless tobacco. She reports that she does not drink alcohol and does not use drugs.    Family History  Ms. Robles's family history includes Diabetes in her maternal grandmother and paternal grandfather; No Known Problems in her father and mother.    Medications and Allergies     Medications  Outpatient Medications Marked as Taking for the  9/11/23 encounter (Office Visit) with Hayley West ACNP   Medication Sig Dispense Refill    amoxicillin-clavulanate 875-125mg (AUGMENTIN) 875-125 mg per tablet Take 1 tablet by mouth every 12 (twelve) hours.      FLUoxetine 40 MG capsule Take 1 capsule (40 mg total) by mouth once daily. 90 capsule 1    hydrOXYzine pamoate (VISTARIL) 25 MG Cap Take 1 capsule (25 mg total) by mouth every 6 (six) hours as needed (anxiety). 60 capsule 1    omeprazole (PRILOSEC) 40 MG capsule Take 1 capsule (40 mg total) by mouth once daily. 90 capsule 1    topiramate (TOPAMAX) 50 MG tablet Take 1 tablet (50 mg total) by mouth 2 (two) times daily. 60 tablet 11     Current Facility-Administered Medications for the 9/11/23 encounter (Office Visit) with Hayley West ACNP   Medication Dose Route Frequency Provider Last Rate Last Admin    [COMPLETED] betamethasone acetate-betamethasone sodium phosphate injection 6 mg  6 mg Intramuscular 1 time in Clinic/HOD Hayley West ACNP   6 mg at 09/11/23 1350    [COMPLETED] cefTRIAXone injection 1 g  1 g Intramuscular 1 time in Clinic/HOD Hayley West ACNP   1 g at 09/11/23 1349       Allergies  Review of patient's allergies indicates:   Allergen Reactions    Codeine Nausea And Vomiting    Hazelnut        Physical Examination     Vitals:    09/11/23 1318   BP: 113/73   Pulse: 85   Resp: 16   Temp: 98.2 °F (36.8 °C)     Physical Exam  Eyes:      Pupils: Pupils are equal, round, and reactive to light.   Cardiovascular:      Rate and Rhythm: Normal rate and regular rhythm.      Heart sounds: Normal heart sounds. No murmur heard.  Pulmonary:      Breath sounds: Normal breath sounds. No wheezing, rhonchi or rales.   Abdominal:      General: Bowel sounds are normal.   Musculoskeletal:         General: No swelling.      Cervical back: Normal range of motion and neck supple.   Skin:     General: Skin is warm and dry.   Neurological:      Mental Status: She is alert and oriented to  "person, place, and time.          Lab Results   Component Value Date    WBC 8.55 06/12/2023    HGB 13.8 06/12/2023    HCT 43.6 06/12/2023    MCV 89.9 06/12/2023     06/12/2023        Sodium   Date Value Ref Range Status   06/12/2023 136 136 - 145 mmol/L Final     Potassium   Date Value Ref Range Status   06/12/2023 4.4 3.5 - 5.1 mmol/L Final     Chloride   Date Value Ref Range Status   06/12/2023 110 (H) 98 - 107 mmol/L Final     CO2   Date Value Ref Range Status   06/12/2023 23 21 - 32 mmol/L Final     Glucose   Date Value Ref Range Status   06/12/2023 72 (L) 74 - 106 mg/dL Final     BUN   Date Value Ref Range Status   06/12/2023 10 7 - 18 mg/dL Final     Creatinine   Date Value Ref Range Status   06/12/2023 0.78 0.55 - 1.02 mg/dL Final     Calcium   Date Value Ref Range Status   06/12/2023 9.0 8.5 - 10.1 mg/dL Final     Total Protein   Date Value Ref Range Status   06/12/2023 7.8 6.4 - 8.2 g/dL Final     Albumin   Date Value Ref Range Status   06/12/2023 4.1 3.5 - 5.0 g/dL Final     Bilirubin, Total   Date Value Ref Range Status   06/12/2023 0.3 >0.0 - 1.2 mg/dL Final     Alk Phos   Date Value Ref Range Status   06/12/2023 88 37 - 98 U/L Final     AST   Date Value Ref Range Status   06/12/2023 23 15 - 37 U/L Final     ALT   Date Value Ref Range Status   06/12/2023 22 13 - 56 U/L Final     Anion Gap   Date Value Ref Range Status   06/12/2023 7 7 - 16 mmol/L Final     eGFR   Date Value Ref Range Status   06/12/2023 103 >=60 mL/min/1.73m2 Final      Lab Results   Component Value Date    HGBA1C 4.8 06/12/2023      Lab Results   Component Value Date    CHOL 232 (H) 06/12/2023    CHOL 192 03/28/2022     Lab Results   Component Value Date    HDL 60 06/12/2023    HDL 52 03/28/2022     Lab Results   Component Value Date    LDLCALC 134 06/12/2023    LDLCALC 111 03/28/2022     No results found for: "DLDL"  Lab Results   Component Value Date    TRIG 190 (H) 06/12/2023    TRIG 145 03/28/2022     Lab Results   Component " Value Date    CHOLHDL 3.9 06/12/2023    CHOLHDL 3.7 03/28/2022      Lab Results   Component Value Date    TSH 2.540 03/28/2022        Assessment and Plan (including Health Maintenance)      Problem List  Smart Sets  Document Outside HM   :    Plan:     1. Acute nasopharyngitis  -     cefTRIAXone injection 1 g  -     betamethasone acetate-betamethasone sodium phosphate injection 6 mg  -     cefdinir (OMNICEF) 300 MG capsule; Take 1 capsule (300 mg total) by mouth 2 (two) times daily. for 10 days  Dispense: 20 capsule; Refill: 0  -     fluconazole (DIFLUCAN) 100 MG tablet; Take 1 tablet (100 mg total) by mouth once daily. for 5 days  Dispense: 5 tablet; Refill: 0  -     fluticasone propionate (FLONASE) 50 mcg/actuation nasal spray; 1 spray (50 mcg total) by Each Nostril route once daily.  Dispense: 11.1 mL; Refill: 0         There are no Patient Instructions on file for this visit.     Health Maintenance Due   Topic Date Due    Cervical Cancer Screening  Never done    Pneumococcal Vaccines (Age 0-64) (1 - PCV) Never done    TETANUS VACCINE  Never done    Influenza Vaccine (1) 09/01/2023         Health Maintenance Topics with due status: Not Due       Topic Last Completion Date    Diabetes Urine Screening 06/12/2023    Hemoglobin A1c 06/12/2023       Future Appointments   Date Time Provider Department Center   12/12/2023  3:20 PM Hayley West ACNP [facility] CARLOTA Swain            Signature:  BALTAZAR Medina [facility] [address] [place] [address]  [phone]    Date of encounter: 9/11/23

## 2023-09-13 ENCOUNTER — PATIENT MESSAGE (OUTPATIENT)
Dept: FAMILY MEDICINE | Facility: CLINIC | Age: 33
End: 2023-09-13
Payer: OTHER GOVERNMENT

## 2023-09-13 RX ORDER — ONDANSETRON 4 MG/1
4 TABLET, ORALLY DISINTEGRATING ORAL EVERY 6 HOURS PRN
Qty: 20 TABLET | Refills: 0 | Status: SHIPPED | OUTPATIENT
Start: 2023-09-13 | End: 2023-11-15

## 2023-09-20 DIAGNOSIS — H65.116 RECURRENT SUBACUTE ALLERGIC OTITIS MEDIA OF BOTH EARS: Primary | ICD-10-CM

## 2023-09-20 NOTE — PROGRESS NOTES
Pt called with cont ear pain. She has been on multiple antibiotics and ear drops without improvement. Will refer to ent for evaluation.

## 2023-09-27 ENCOUNTER — OFFICE VISIT (OUTPATIENT)
Dept: OTOLARYNGOLOGY | Facility: CLINIC | Age: 33
End: 2023-09-27
Payer: OTHER GOVERNMENT

## 2023-09-27 VITALS — BODY MASS INDEX: 30.22 KG/M2 | HEIGHT: 66 IN | WEIGHT: 188 LBS

## 2023-09-27 DIAGNOSIS — H66.93 BILATERAL OTITIS MEDIA, UNSPECIFIED OTITIS MEDIA TYPE: Primary | ICD-10-CM

## 2023-09-27 DIAGNOSIS — H69.93 ETD (EUSTACHIAN TUBE DYSFUNCTION), BILATERAL: ICD-10-CM

## 2023-09-27 DIAGNOSIS — H65.116 RECURRENT SUBACUTE ALLERGIC OTITIS MEDIA OF BOTH EARS: ICD-10-CM

## 2023-09-27 DIAGNOSIS — R42 VERTIGO: ICD-10-CM

## 2023-09-27 PROCEDURE — 99204 PR OFFICE/OUTPT VISIT, NEW, LEVL IV, 45-59 MIN: ICD-10-PCS | Mod: S$PBB,,, | Performed by: OTOLARYNGOLOGY

## 2023-09-27 PROCEDURE — 99204 OFFICE O/P NEW MOD 45 MIN: CPT | Mod: S$PBB,,, | Performed by: OTOLARYNGOLOGY

## 2023-09-27 PROCEDURE — 99213 OFFICE O/P EST LOW 20 MIN: CPT | Mod: PBBFAC | Performed by: OTOLARYNGOLOGY

## 2023-09-27 RX ORDER — TRIAMCINOLONE ACETONIDE 55 UG/1
2 SPRAY, METERED NASAL DAILY
Qty: 16.9 ML | Refills: 2 | COMMUNITY
Start: 2023-09-27 | End: 2023-11-15

## 2023-09-27 RX ORDER — METHYLPREDNISOLONE 4 MG/1
TABLET ORAL
Qty: 1 EACH | Refills: 0 | Status: SHIPPED | OUTPATIENT
Start: 2023-09-27 | End: 2023-11-15

## 2023-09-27 RX ORDER — AMOXICILLIN AND CLAVULANATE POTASSIUM 500; 125 MG/1; MG/1
1 TABLET, FILM COATED ORAL 2 TIMES DAILY
Qty: 28 TABLET | Refills: 0 | Status: SHIPPED | OUTPATIENT
Start: 2023-09-27 | End: 2023-11-15

## 2023-09-27 NOTE — PROGRESS NOTES
Subjective:       Patient ID: Esthela Robles is a 32 y.o. female.    Chief Complaint: Otitis Media (Left ear pops and crackles x 5 weeks, has tried ciprodex ear gtts and seems to make ears muffled. )  Also some dizziness also been on PO antibiotics not helping   Otitis Media:    Associated symptoms: Ear pain.      Review of Systems   HENT:  Positive for ear pain, hearing loss and tinnitus.    All other systems reviewed and are negative.      Objective:      Physical Exam  General: NAD  Head: Normocephalic, atraumatic, no facial asymmetry/normal strength,  Ears: Both auricules normal in appearance, w/o deformities tympanic membranes dull external auditory canals normal  Nose: External nose w/o deformities normal turbinates no drainage or inflammation  Oral Cavity: Lips, gums, floor of mouth, tongue hard palate, and buccal mucosa without mass/lesion  Oropharynx: Mucosa pink and moist, soft palate, posterior pharynx and oropharyngeal wall without mass/lesion  Neck: Supple, symmetric, trachea midline, no palpable mass/lesion, no palpable cervical lymphadenopathy  Skin: Warm and dry, no concerning lesions  Respiratory: Respirations even, unlabored     TYMPANOGRAMS As bilaterally   Assessment:       1. Bilateral otitis media, unspecified otitis media type    2. Recurrent subacute allergic otitis media of both ears    3. ETD (Eustachian tube dysfunction), bilateral    4. Vertigo        Plan:       Augmentin medrol Nasacort   F/u 3 weeks   Fluid getting some better may need right tube

## 2023-11-08 ENCOUNTER — PATIENT MESSAGE (OUTPATIENT)
Dept: FAMILY MEDICINE | Facility: CLINIC | Age: 33
End: 2023-11-08
Payer: OTHER GOVERNMENT

## 2023-11-08 DIAGNOSIS — G43.909 MIGRAINE WITHOUT STATUS MIGRAINOSUS, NOT INTRACTABLE, UNSPECIFIED MIGRAINE TYPE: Primary | ICD-10-CM

## 2023-11-15 ENCOUNTER — DOCUMENTATION ONLY (OUTPATIENT)
Dept: NEUROLOGY | Facility: CLINIC | Age: 33
End: 2023-11-15
Payer: OTHER GOVERNMENT

## 2023-11-15 ENCOUNTER — OFFICE VISIT (OUTPATIENT)
Dept: NEUROLOGY | Facility: CLINIC | Age: 33
End: 2023-11-15
Payer: OTHER GOVERNMENT

## 2023-11-15 VITALS
RESPIRATION RATE: 18 BRPM | OXYGEN SATURATION: 97 % | DIASTOLIC BLOOD PRESSURE: 62 MMHG | WEIGHT: 188 LBS | BODY MASS INDEX: 30.22 KG/M2 | HEART RATE: 103 BPM | SYSTOLIC BLOOD PRESSURE: 112 MMHG | HEIGHT: 66 IN

## 2023-11-15 DIAGNOSIS — R51.9 GENERALIZED HEADACHES: Primary | ICD-10-CM

## 2023-11-15 DIAGNOSIS — G43.909 MIGRAINE WITHOUT STATUS MIGRAINOSUS, NOT INTRACTABLE, UNSPECIFIED MIGRAINE TYPE: ICD-10-CM

## 2023-11-15 PROCEDURE — 99204 PR OFFICE/OUTPT VISIT, NEW, LEVL IV, 45-59 MIN: ICD-10-PCS | Mod: S$PBB,,, | Performed by: PSYCHIATRY & NEUROLOGY

## 2023-11-15 PROCEDURE — 99214 OFFICE O/P EST MOD 30 MIN: CPT | Mod: PBBFAC | Performed by: PSYCHIATRY & NEUROLOGY

## 2023-11-15 PROCEDURE — 99204 OFFICE O/P NEW MOD 45 MIN: CPT | Mod: S$PBB,,, | Performed by: PSYCHIATRY & NEUROLOGY

## 2023-11-15 RX ORDER — ZOLMITRIPTAN 5 MG/1
5 TABLET, ORALLY DISINTEGRATING ORAL DAILY PRN
Qty: 36 TABLET | Refills: 3 | Status: SHIPPED | OUTPATIENT
Start: 2023-11-15 | End: 2023-11-20 | Stop reason: SDUPTHER

## 2023-11-15 RX ORDER — ERENUMAB-AOOE 140 MG/ML
140 INJECTION, SOLUTION SUBCUTANEOUS
Qty: 3 EACH | Refills: 3 | Status: SHIPPED | OUTPATIENT
Start: 2023-11-15 | End: 2024-03-11

## 2023-11-15 NOTE — PROGRESS NOTES
Provider ordered Aimovig today and with Prior Authorization process this may take a few days or more.  Pt has severe headache, so we are giving a sample of Emgality 120m/ml Lot#[id number] exp 7/2/25.  She understands she will need to refrigerate as soon as she gets home.  V/U

## 2023-11-15 NOTE — PATIENT INSTRUCTIONS
Trial of Aimovig monthly  Stop smoking tobacco  increase physical activity and weight reduction  Improve sleep habits  Avoid any headache triggers  Recommend behavioral health-psychotherapy for treatment of bipolar depression and anxiety  Follow-up three-months

## 2023-11-15 NOTE — PROGRESS NOTES
Subjective:       Patient ID: Esthela Robles is a 33 y.o. female     Chief Complaint:    Chief Complaint   Patient presents with    migraines        Allergies:  Codeine and Hazelnut    Current Medications:    Outpatient Encounter Medications as of 11/15/2023   Medication Sig Dispense Refill    amoxicillin-clavulanate 875-125mg (AUGMENTIN) 875-125 mg per tablet Take 1 tablet by mouth every 12 (twelve) hours.      erenumab-aooe (AIMOVIG AUTOINJECTOR) 140 mg/mL autoinjector Inject 1 mL (140 mg total) into the skin every 28 days. 3 each 3    [DISCONTINUED] amoxicillin-clavulanate 500-125mg (AUGMENTIN) 500-125 mg Tab Take 1 tablet (500 mg total) by mouth 2 (two) times daily. (Patient not taking: Reported on 11/15/2023) 28 tablet 0    [DISCONTINUED] FLUoxetine 40 MG capsule Take 1 capsule (40 mg total) by mouth once daily. (Patient not taking: Reported on 11/15/2023) 90 capsule 1    [DISCONTINUED] fluticasone propionate (FLONASE) 50 mcg/actuation nasal spray 1 spray (50 mcg total) by Each Nostril route once daily. (Patient not taking: Reported on 11/15/2023) 11.1 mL 0    [DISCONTINUED] hydrOXYzine pamoate (VISTARIL) 25 MG Cap Take 1 capsule (25 mg total) by mouth every 6 (six) hours as needed (anxiety). (Patient not taking: Reported on 11/15/2023) 60 capsule 1    [DISCONTINUED] methylPREDNISolone (MEDROL DOSEPACK) 4 mg tablet use as directed (Patient not taking: Reported on 11/15/2023) 1 each 0    [DISCONTINUED] omeprazole (PRILOSEC) 40 MG capsule Take 1 capsule (40 mg total) by mouth once daily. (Patient not taking: Reported on 11/15/2023) 90 capsule 1    [DISCONTINUED] ondansetron (ZOFRAN-ODT) 4 MG TbDL Take 1 tablet (4 mg total) by mouth every 6 (six) hours as needed (nasuea). (Patient not taking: Reported on 11/15/2023) 20 tablet 0    [DISCONTINUED] topiramate (TOPAMAX) 50 MG tablet Take 1 tablet (50 mg total) by mouth 2 (two) times daily. (Patient not taking: Reported on 11/15/2023) 60 tablet 11    [DISCONTINUED]  "triamcinolone (NASACORT) 55 mcg nasal inhaler 2 sprays by Nasal route once daily. (Patient not taking: Reported on 11/15/2023) 16.9 mL 2     No facility-administered encounter medications on file as of 11/15/2023.       History of Present Illness  33-year-old white female who recently gave birth to twins referred to clinic for evaluation of headaches-possible migraines.  Patient has a significant psych history with severe anxiety, depression and bipolar disorder and continues to smoke tobacco daily, along with her recent postpartum status with twins and the resultant sleepless nights all of which exacerbate the tendency for generalized headaches and/or migraines.   She has had migraine since age 17 - has tried and failed mutl HEADACHE meds: TOPAMAX, Nurtec,Zomig, Imitirex, OTC meds   Recently has had incr freq of almost daily  Described as R Retrorobital, N/V, photo/phonosensitivity            Past Medical History:   Diagnosis Date    Depression        Past Surgical History:   Procedure Laterality Date    CHOLECYSTECTOMY  2013       Social History  Ms. Robles  reports that she has been smoking cigarettes. She has never used smokeless tobacco. She reports that she does not drink alcohol and does not use drugs.    Family History  Ms.'s Robles family history includes Diabetes in her maternal grandmother and paternal grandfather; No Known Problems in her father and mother.    Review of Systems  Review of Systems   Neurological:  Positive for headaches.   Psychiatric/Behavioral:  Positive for depression. The patient is nervous/anxious.    All other systems reviewed and are negative.     Objective:   /62 (BP Location: Right arm, Patient Position: Sitting, BP Method: Large (Manual))   Pulse 103   Resp 18   Ht 5' 6" (1.676 m)   Wt 85.3 kg (188 lb)   SpO2 97%   BMI 30.34 kg/m²    NEUROLOGICAL EXAMINATION:     MENTAL STATUS   Oriented to person, place, and time.   Level of consciousness: alert  Knowledge: consistent " with education.        Anxiety/depression/bipolar disorder     CRANIAL NERVES   Cranial nerves II through XII intact.     MOTOR EXAM     Strength   Strength 5/5 throughout.     GAIT AND COORDINATION     Gait  Gait: normal       Physical Exam  Vitals reviewed.   Constitutional:       Appearance: She is obese.   Neurological:      General: No focal deficit present.      Mental Status: She is alert and oriented to person, place, and time. Mental status is at baseline.      Cranial Nerves: Cranial nerves 2-12 are intact.      Motor: Motor strength is normal.     Gait: Gait is intact.          Assessment:     Generalized headaches    Migraine without status migrainosus, not intractable, unspecified migraine type  -     Ambulatory referral/consult to Neurology    Other orders  -     erenumab-aooe (AIMOVIG AUTOINJECTOR) 140 mg/mL autoinjector; Inject 1 mL (140 mg total) into the skin every 28 days.  Dispense: 3 each; Refill: 3         Primary Diagnosis and ICD10  Generalized headaches [R51.9]    Plan:     Patient Instructions   Trial of Aimovig monthly  Stop smoking tobacco  increase physical activity and weight reduction  Improve sleep habits  Avoid any headache triggers  Recommend behavioral health-psychotherapy for treatment of bipolar depression and anxiety  Follow-up three-months      Medications Discontinued During This Encounter   Medication Reason    amoxicillin-clavulanate 500-125mg (AUGMENTIN) 500-125 mg Tab     FLUoxetine 40 MG capsule     fluticasone propionate (FLONASE) 50 mcg/actuation nasal spray     hydrOXYzine pamoate (VISTARIL) 25 MG Cap     methylPREDNISolone (MEDROL DOSEPACK) 4 mg tablet     omeprazole (PRILOSEC) 40 MG capsule     ondansetron (ZOFRAN-ODT) 4 MG TbDL     topiramate (TOPAMAX) 50 MG tablet     triamcinolone (NASACORT) 55 mcg nasal inhaler        Requested Prescriptions     Signed Prescriptions Disp Refills    erenumab-aooe (AIMOVIG AUTOINJECTOR) 140 mg/mL autoinjector 3 each 3     Sig:  Inject 1 mL (140 mg total) into the skin every 28 days.

## 2023-11-20 ENCOUNTER — PATIENT MESSAGE (OUTPATIENT)
Dept: NEUROLOGY | Facility: CLINIC | Age: 33
End: 2023-11-20
Payer: OTHER GOVERNMENT

## 2023-11-20 RX ORDER — ZOLMITRIPTAN 5 MG/1
5 TABLET, ORALLY DISINTEGRATING ORAL DAILY PRN
Qty: 36 TABLET | Refills: 3 | Status: SHIPPED | OUTPATIENT
Start: 2023-11-20 | End: 2023-11-21 | Stop reason: SDUPTHER

## 2023-11-21 RX ORDER — ZOLMITRIPTAN 5 MG/1
5 TABLET, ORALLY DISINTEGRATING ORAL DAILY PRN
Qty: 36 TABLET | Refills: 3 | Status: SHIPPED | OUTPATIENT
Start: 2023-11-21 | End: 2024-03-11

## 2024-01-17 ENCOUNTER — OFFICE VISIT (OUTPATIENT)
Dept: FAMILY MEDICINE | Facility: CLINIC | Age: 34
End: 2024-01-17
Payer: OTHER GOVERNMENT

## 2024-01-17 VITALS
DIASTOLIC BLOOD PRESSURE: 75 MMHG | OXYGEN SATURATION: 98 % | TEMPERATURE: 98 F | RESPIRATION RATE: 18 BRPM | BODY MASS INDEX: 28.93 KG/M2 | SYSTOLIC BLOOD PRESSURE: 113 MMHG | HEIGHT: 66 IN | WEIGHT: 180 LBS | HEART RATE: 80 BPM

## 2024-01-17 DIAGNOSIS — R05.9 COUGH, UNSPECIFIED TYPE: ICD-10-CM

## 2024-01-17 DIAGNOSIS — J02.9 SORE THROAT: ICD-10-CM

## 2024-01-17 DIAGNOSIS — R09.81 NASAL CONGESTION: ICD-10-CM

## 2024-01-17 DIAGNOSIS — J32.0 MAXILLARY SINUSITIS, UNSPECIFIED CHRONICITY: Primary | ICD-10-CM

## 2024-01-17 LAB
CTP QC/QA: YES
S PYO RRNA THROAT QL PROBE: NEGATIVE

## 2024-01-17 PROCEDURE — 99213 OFFICE O/P EST LOW 20 MIN: CPT | Mod: 25,,,

## 2024-01-17 PROCEDURE — 87880 STREP A ASSAY W/OPTIC: CPT | Mod: QW,,,

## 2024-01-17 PROCEDURE — 96372 THER/PROPH/DIAG INJ SC/IM: CPT | Mod: ,,,

## 2024-01-17 RX ORDER — FLUOXETINE HYDROCHLORIDE 20 MG/1
20 CAPSULE ORAL
COMMUNITY
End: 2024-02-05 | Stop reason: SDUPTHER

## 2024-01-17 RX ORDER — BETAMETHASONE SODIUM PHOSPHATE AND BETAMETHASONE ACETATE 3; 3 MG/ML; MG/ML
6 INJECTION, SUSPENSION INTRA-ARTICULAR; INTRALESIONAL; INTRAMUSCULAR; SOFT TISSUE
Status: COMPLETED | OUTPATIENT
Start: 2024-01-17 | End: 2024-01-17

## 2024-01-17 RX ORDER — FLUCONAZOLE 100 MG/1
100 TABLET ORAL DAILY
Qty: 5 TABLET | Refills: 0 | Status: SHIPPED | OUTPATIENT
Start: 2024-01-17 | End: 2024-01-22

## 2024-01-17 RX ORDER — TOPIRAMATE 25 MG/1
25 TABLET ORAL 2 TIMES DAILY
COMMUNITY
End: 2024-03-11 | Stop reason: SDUPTHER

## 2024-01-17 RX ORDER — AMOXICILLIN AND CLAVULANATE POTASSIUM 875; 125 MG/1; MG/1
1 TABLET, FILM COATED ORAL EVERY 12 HOURS
Qty: 14 TABLET | Refills: 0 | Status: SHIPPED | OUTPATIENT
Start: 2024-01-17 | End: 2024-01-24

## 2024-01-17 RX ORDER — CEFTRIAXONE 1 G/1
1 INJECTION, POWDER, FOR SOLUTION INTRAMUSCULAR; INTRAVENOUS
Status: COMPLETED | OUTPATIENT
Start: 2024-01-17 | End: 2024-01-17

## 2024-01-17 RX ORDER — FLUTICASONE PROPIONATE 50 MCG
1 SPRAY, SUSPENSION (ML) NASAL DAILY
Qty: 11.1 ML | Refills: 0 | Status: SHIPPED | OUTPATIENT
Start: 2024-01-17 | End: 2024-03-11

## 2024-01-17 RX ADMIN — BETAMETHASONE SODIUM PHOSPHATE AND BETAMETHASONE ACETATE 6 MG: 3; 3 INJECTION, SUSPENSION INTRA-ARTICULAR; INTRALESIONAL; INTRAMUSCULAR; SOFT TISSUE at 03:01

## 2024-01-17 RX ADMIN — CEFTRIAXONE 1 G: 1 INJECTION, POWDER, FOR SOLUTION INTRAMUSCULAR; INTRAVENOUS at 03:01

## 2024-01-17 NOTE — PROGRESS NOTES
Saji Vásquez NP   [address] [place] [address]     PATIENT NAME: Esthela Robles  : 1990  DATE: 24  MRN: [id number]      Billing Provider: Saji Vásquez NP  Level of Service:   Patient PCP Information       Provider PCP Type    Hayley Donis DNP General            Reason for Visit / Chief Complaint: Cough and Nasal Congestion       Update PCP  Update Chief Complaint         History of Present Illness / Problem Focused Workflow     Esthela Robles presents to the clinic with Cough and Nasal Congestion     Patient here today with complaints of sore throat, sinus pressure, runny nose with clear drainage, nasal congestion. Symptoms started .  Denies fever, chills, or body aches.  Tests today negative. Medications administered in clinic. Medications sent to pharmacy. Increase fluid intake. Continue cough medication. Return to clinic if symptoms worsen and as needed.     Cough  Associated symptoms include postnasal drip, rhinorrhea and a sore throat.       Review of Systems     Review of Systems   Constitutional: Negative.    HENT:  Positive for nasal congestion, postnasal drip, rhinorrhea, sinus pressure/congestion and sore throat.    Eyes: Negative.    Respiratory:  Positive for cough.    Cardiovascular: Negative.    Gastrointestinal: Negative.    Endocrine: Negative.    Genitourinary: Negative.    Musculoskeletal: Negative.    Integumentary:  Negative.   Allergic/Immunologic: Negative.    Neurological: Negative.    Hematological: Negative.    Psychiatric/Behavioral: Negative.          Medical / Social / Family History     Past Medical History:   Diagnosis Date    Depression        Past Surgical History:   Procedure Laterality Date    CHOLECYSTECTOMY         Social History  Ms.  reports that she has been smoking cigarettes. She has never used smokeless tobacco. She reports that she does not drink alcohol and does not use drugs.    Family History  Ms.'s family history  "includes Diabetes in her maternal grandmother and paternal grandfather; No Known Problems in her father and mother.    Medications and Allergies     Medications  Outpatient Medications Marked as Taking for the 1/17/24 encounter (Office Visit) with Saji Vásquez NP   Medication Sig Dispense Refill    erenumab-aooe (AIMOVIG AUTOINJECTOR) 140 mg/mL autoinjector Inject 1 mL (140 mg total) into the skin every 28 days. 3 each 3    ZOLMitriptan (ZOMIG-ZMT) 5 MG disintegrating tablet Take 1 tablet (5 mg total) by mouth daily as needed for Migraine (take at onset of migraine). 36 tablet 3       Allergies  Review of patient's allergies indicates:   Allergen Reactions    Codeine Nausea And Vomiting    Hazelnut        Physical Examination   /75 (BP Location: Left arm, Patient Position: Sitting, BP Method: X-Large (Automatic))   Pulse 80   Temp 97.9 °F (36.6 °C) (Oral)   Resp 18   Ht 5' 6" (1.676 m)   Wt 81.6 kg (180 lb) Comment: pt did not want to weigh stated weight is 180  SpO2 98%   BMI 29.05 kg/m²    Physical Exam  Vitals reviewed.   Constitutional:       Appearance: She is normal weight. She is ill-appearing.   HENT:      Head: Normocephalic.      Right Ear: Tympanic membrane normal.      Left Ear: Tympanic membrane normal.      Nose: Congestion and rhinorrhea present. Rhinorrhea is clear.      Right Turbinates: Swollen.      Left Turbinates: Swollen.      Mouth/Throat:      Mouth: Mucous membranes are moist.      Pharynx: Posterior oropharyngeal erythema present. No oropharyngeal exudate.   Eyes:      Extraocular Movements: Extraocular movements intact.      Conjunctiva/sclera: Conjunctivae normal.      Pupils: Pupils are equal, round, and reactive to light.   Cardiovascular:      Rate and Rhythm: Normal rate and regular rhythm.      Pulses: Normal pulses.      Heart sounds: Normal heart sounds.   Pulmonary:      Effort: Pulmonary effort is normal.      Breath sounds: Normal breath sounds.   Abdominal: "      General: Abdomen is flat. Bowel sounds are normal.      Palpations: Abdomen is soft.   Musculoskeletal:         General: Normal range of motion.      Cervical back: Normal range of motion and neck supple. No tenderness.   Lymphadenopathy:      Cervical: Cervical adenopathy present.   Skin:     General: Skin is warm and dry.      Capillary Refill: Capillary refill takes less than 2 seconds.   Neurological:      General: No focal deficit present.      Mental Status: She is oriented to person, place, and time. Mental status is at baseline.   Psychiatric:         Mood and Affect: Mood normal.         Behavior: Behavior normal.        Assessment and Plan (including Health Maintenance)      Problem List  Smart Sets  Document Outside HM   :    Plan:   Medications sent to pharmacy  Increase fluid intake  Continue cough medication  Return to clinic if symptoms worsen and as needed.         Health Maintenance Due   Topic Date Due    Cervical Cancer Screening  Never done    Pneumococcal Vaccines (Age 0-64) (1 - PCV) Never done    TETANUS VACCINE  Never done    Influenza Vaccine (1) 09/01/2023    Hemoglobin A1c  12/12/2023       Problem List Items Addressed This Visit    None  Visit Diagnoses       Sore throat    -  Primary    Relevant Orders    POCT rapid strep A            Health Maintenance Topics with due status: Not Due       Topic Last Completion Date    Diabetes Urine Screening 06/12/2023       No future appointments.         Signature:  Saji Vásquez NP      [address] [place] [address]    Date of encounter: 1/17/24

## 2024-01-21 PROBLEM — J02.9 SORE THROAT: Status: ACTIVE | Noted: 2024-01-21

## 2024-01-21 PROBLEM — R05.9 COUGH: Status: ACTIVE | Noted: 2024-01-21

## 2024-01-21 PROBLEM — J32.0 MAXILLARY SINUSITIS: Status: ACTIVE | Noted: 2024-01-21

## 2024-01-21 PROBLEM — R09.81 NASAL CONGESTION: Status: ACTIVE | Noted: 2024-01-21

## 2024-01-22 NOTE — PATIENT INSTRUCTIONS
Medications sent to pharmacy  Increase fluid intake  Continue cough medication  Return to clinic if symptoms worsen and as needed.

## 2024-01-22 NOTE — ASSESSMENT & PLAN NOTE
Patient here today with complaints of sore throat, sinus pressure, runny nose with clear drainage, nasal congestion. Symptoms started Sunday.  Denies fever, chills, or body aches.  Tests today negative. Medications administered in clinic. Medications sent to pharmacy. Increase fluid intake. Return to clinic if symptoms worsen and as needed.

## 2024-02-05 ENCOUNTER — OFFICE VISIT (OUTPATIENT)
Dept: FAMILY MEDICINE | Facility: CLINIC | Age: 34
End: 2024-02-05
Payer: OTHER GOVERNMENT

## 2024-02-05 VITALS
HEART RATE: 80 BPM | RESPIRATION RATE: 18 BRPM | OXYGEN SATURATION: 98 % | HEIGHT: 66 IN | DIASTOLIC BLOOD PRESSURE: 78 MMHG | BODY MASS INDEX: 30.16 KG/M2 | SYSTOLIC BLOOD PRESSURE: 134 MMHG | TEMPERATURE: 98 F | WEIGHT: 187.63 LBS

## 2024-02-05 DIAGNOSIS — R42 DIZZINESS: ICD-10-CM

## 2024-02-05 DIAGNOSIS — F41.9 SEVERE ANXIETY: Primary | ICD-10-CM

## 2024-02-05 PROCEDURE — 99213 OFFICE O/P EST LOW 20 MIN: CPT | Mod: ,,,

## 2024-02-05 RX ORDER — FLUOXETINE HYDROCHLORIDE 20 MG/1
20 CAPSULE ORAL DAILY
Qty: 30 CAPSULE | Refills: 2 | Status: SHIPPED | OUTPATIENT
Start: 2024-02-05 | End: 2024-03-11 | Stop reason: SDUPTHER

## 2024-02-05 RX ORDER — MECLIZINE HYDROCHLORIDE 25 MG/1
25 TABLET ORAL 3 TIMES DAILY PRN
Qty: 90 TABLET | Refills: 0 | Status: SHIPPED | OUTPATIENT
Start: 2024-02-05 | End: 2024-03-06

## 2024-02-06 NOTE — ASSESSMENT & PLAN NOTE
Patient here today for complaints of anxiety and dizziness. She reports stopping Fluoxetine 2 to 3 months ago. She is unable to take Hydroxyzine because it causes drowsiness. She reports anxiety has gotten worse since being off medication. Will refill medication. Medications sent to pharmacy. Return to clinic in 1 month and as needed.

## 2024-02-06 NOTE — PROGRESS NOTES
Saji Vásquez NP   [address] [place] [address]     PATIENT NAME: Esthela Robles  : 1990  DATE: 24  MRN: [id number]      Billing Provider: Saji Vásquez NP  Level of Service:   Patient PCP Information       Provider PCP Type    Hayley Donis DNP General            Reason for Visit / Chief Complaint: Anxiety (Been out of medication for 3 months.)       Update PCP  Update Chief Complaint         History of Present Illness / Problem Focused Workflow     Esthela Robles presents to the clinic with Anxiety (Been out of medication for 3 months.)     Patient here today for complaints of anxiety and dizziness. She reports stopping Fluoxetine 2 to 3 months ago. She is unable to take Hydroxyzine because it causes drowsiness. She reports anxiety has gotten worse since being off medication. Will refill medication. Medications sent to pharmacy. Return to clinic in 1 month and as needed.     Anxiety  Symptoms include dizziness and nervous/anxious behavior. Patient reports no confusion, decreased concentration or suicidal ideas.           Review of Systems     Review of Systems   Constitutional: Negative.    HENT: Negative.     Eyes: Negative.    Respiratory: Negative.     Cardiovascular: Negative.    Gastrointestinal: Negative.    Endocrine: Negative.    Musculoskeletal: Negative.    Integumentary:  Negative.   Neurological:  Positive for dizziness.   Psychiatric/Behavioral:  Negative for behavioral problems, confusion, decreased concentration and suicidal ideas. The patient is nervous/anxious.         Medical / Social / Family History     Past Medical History:   Diagnosis Date    Depression        Past Surgical History:   Procedure Laterality Date    CHOLECYSTECTOMY         Social History  Ms.  reports that she has been smoking cigarettes. She has never been exposed to tobacco smoke. She has never used smokeless tobacco. She reports that she does not drink alcohol and does not use  "drugs.    Family History  Ms.'s family history includes Diabetes in her maternal grandmother and paternal grandfather; No Known Problems in her father and mother.    Medications and Allergies     Medications  No outpatient medications have been marked as taking for the 2/5/24 encounter (Office Visit) with Saji Vásquez NP.       Allergies  Review of patient's allergies indicates:   Allergen Reactions    Codeine Nausea And Vomiting    Hazelnut        Physical Examination   /78 (BP Location: Right arm, Patient Position: Sitting, BP Method: Large (Automatic))   Pulse 80   Temp 98.2 °F (36.8 °C) (Oral)   Resp 18   Ht 5' 6" (1.676 m)   Wt 85.1 kg (187 lb 9.6 oz)   SpO2 98%   BMI 30.28 kg/m²    Physical Exam  Vitals reviewed.   Constitutional:       Appearance: Normal appearance. She is obese.   HENT:      Right Ear: Tympanic membrane normal.      Left Ear: Tympanic membrane normal.      Nose: Nose normal.      Mouth/Throat:      Mouth: Mucous membranes are moist.      Pharynx: Oropharynx is clear.   Eyes:      Extraocular Movements: Extraocular movements intact.      Conjunctiva/sclera: Conjunctivae normal.      Pupils: Pupils are equal, round, and reactive to light.   Cardiovascular:      Rate and Rhythm: Normal rate and regular rhythm.      Pulses: Normal pulses.      Heart sounds: Normal heart sounds.   Pulmonary:      Effort: Pulmonary effort is normal.      Breath sounds: Normal breath sounds.   Abdominal:      General: Abdomen is flat. Bowel sounds are normal.      Palpations: Abdomen is soft.   Musculoskeletal:         General: Normal range of motion.      Cervical back: Normal range of motion and neck supple.   Skin:     General: Skin is warm and dry.      Capillary Refill: Capillary refill takes less than 2 seconds.   Neurological:      General: No focal deficit present.      Mental Status: She is alert and oriented to person, place, and time. Mental status is at baseline.   Psychiatric:       "   Mood and Affect: Mood is anxious.         Speech: Speech normal.         Behavior: Behavior normal. Behavior is cooperative.         Thought Content: Thought content normal.          Assessment and Plan (including Health Maintenance)      Problem List  Smart Sets  Document Outside HM   :    Plan:   Medication sent to pharmacy  Return to clinic in 1 month and as needed.         Health Maintenance Due   Topic Date Due    Cervical Cancer Screening  Never done    Pneumococcal Vaccines (Age 0-64) (1 of 2 - PCV) Never done    TETANUS VACCINE  Never done    Influenza Vaccine (1) 09/01/2023    Hemoglobin A1c  12/12/2023       Problem List Items Addressed This Visit    None      Health Maintenance Topics with due status: Not Due       Topic Last Completion Date    Diabetes Urine Screening 06/12/2023       Future Appointments   Date Time Provider Department Center   2/6/2024  8:30 AM Jere Maria MD [facility] NEUR[facility]            Signature:  Saji Vásquez NP      [address] [place] [address]    Date of encounter: 2/5/24

## 2024-03-11 ENCOUNTER — OFFICE VISIT (OUTPATIENT)
Dept: FAMILY MEDICINE | Facility: CLINIC | Age: 34
End: 2024-03-11
Payer: OTHER GOVERNMENT

## 2024-03-11 VITALS
BODY MASS INDEX: 30.22 KG/M2 | RESPIRATION RATE: 20 BRPM | OXYGEN SATURATION: 97 % | SYSTOLIC BLOOD PRESSURE: 129 MMHG | DIASTOLIC BLOOD PRESSURE: 79 MMHG | WEIGHT: 188 LBS | TEMPERATURE: 98 F | HEIGHT: 66 IN | HEART RATE: 91 BPM

## 2024-03-11 DIAGNOSIS — Z13.6 ENCOUNTER FOR SCREENING FOR CARDIOVASCULAR DISORDERS: ICD-10-CM

## 2024-03-11 DIAGNOSIS — F41.0 PANIC ATTACKS: ICD-10-CM

## 2024-03-11 DIAGNOSIS — F31.9 BIPOLAR DEPRESSION: ICD-10-CM

## 2024-03-11 DIAGNOSIS — F41.9 SEVERE ANXIETY: Primary | ICD-10-CM

## 2024-03-11 DIAGNOSIS — R53.83 FATIGUE, UNSPECIFIED TYPE: ICD-10-CM

## 2024-03-11 DIAGNOSIS — Z23 NEED FOR VACCINATION: ICD-10-CM

## 2024-03-11 DIAGNOSIS — R73.9 HYPERGLYCEMIA: ICD-10-CM

## 2024-03-11 DIAGNOSIS — Z79.899 LONG TERM CURRENT USE OF THERAPEUTIC DRUG: ICD-10-CM

## 2024-03-11 PROBLEM — R73.01 IMPAIRED FASTING BLOOD SUGAR: Status: RESOLVED | Noted: 2022-03-28 | Resolved: 2024-03-11

## 2024-03-11 LAB
25(OH)D3 SERPL-MCNC: 13.4 NG/ML
ALBUMIN SERPL BCP-MCNC: 3.7 G/DL (ref 3.5–5)
ALBUMIN/GLOB SERPL: 0.9 {RATIO}
ALP SERPL-CCNC: 78 U/L (ref 37–98)
ALT SERPL W P-5'-P-CCNC: 29 U/L (ref 13–56)
ANION GAP SERPL CALCULATED.3IONS-SCNC: 11 MMOL/L (ref 7–16)
AST SERPL W P-5'-P-CCNC: 28 U/L (ref 15–37)
BASOPHILS # BLD AUTO: 0.12 K/UL (ref 0–0.2)
BASOPHILS NFR BLD AUTO: 1.5 % (ref 0–1)
BILIRUB SERPL-MCNC: 0.5 MG/DL (ref ?–1.2)
BUN SERPL-MCNC: 8 MG/DL (ref 7–18)
BUN/CREAT SERPL: 10 (ref 6–20)
CALCIUM SERPL-MCNC: 9.4 MG/DL (ref 8.5–10.1)
CHLORIDE SERPL-SCNC: 105 MMOL/L (ref 98–107)
CHOLEST SERPL-MCNC: 207 MG/DL (ref 0–200)
CHOLEST/HDLC SERPL: 3.6 {RATIO}
CO2 SERPL-SCNC: 26 MMOL/L (ref 21–32)
CREAT SERPL-MCNC: 0.79 MG/DL (ref 0.55–1.02)
DIFFERENTIAL METHOD BLD: ABNORMAL
EGFR (NO RACE VARIABLE) (RUSH/TITUS): 101 ML/MIN/1.73M2
EOSINOPHIL # BLD AUTO: 0.3 K/UL (ref 0–0.5)
EOSINOPHIL NFR BLD AUTO: 3.8 % (ref 1–4)
ERYTHROCYTE [DISTWIDTH] IN BLOOD BY AUTOMATED COUNT: 15.5 % (ref 11.5–14.5)
GLOBULIN SER-MCNC: 3.9 G/DL (ref 2–4)
GLUCOSE SERPL-MCNC: 89 MG/DL (ref 74–106)
HCT VFR BLD AUTO: 42.7 % (ref 38–47)
HDLC SERPL-MCNC: 57 MG/DL (ref 40–60)
HGB BLD-MCNC: 13.5 G/DL (ref 12–16)
IMM GRANULOCYTES # BLD AUTO: 0.01 K/UL (ref 0–0.04)
IMM GRANULOCYTES NFR BLD: 0.1 % (ref 0–0.4)
IRON SATN MFR SERPL: 34 % (ref 14–50)
IRON SERPL-MCNC: 115 ΜG/DL (ref 50–170)
LDLC SERPL CALC-MCNC: 130 MG/DL
LDLC/HDLC SERPL: 2.3 {RATIO}
LYMPHOCYTES # BLD AUTO: 2.64 K/UL (ref 1–4.8)
LYMPHOCYTES NFR BLD AUTO: 33.7 % (ref 27–41)
MCH RBC QN AUTO: 28.7 PG (ref 27–31)
MCHC RBC AUTO-ENTMCNC: 31.6 G/DL (ref 32–36)
MCV RBC AUTO: 90.9 FL (ref 80–96)
MONOCYTES # BLD AUTO: 0.67 K/UL (ref 0–0.8)
MONOCYTES NFR BLD AUTO: 8.5 % (ref 2–6)
MPC BLD CALC-MCNC: 10.6 FL (ref 9.4–12.4)
NEUTROPHILS # BLD AUTO: 4.1 K/UL (ref 1.8–7.7)
NEUTROPHILS NFR BLD AUTO: 52.4 % (ref 53–65)
NONHDLC SERPL-MCNC: 150 MG/DL
NRBC # BLD AUTO: 0 X10E3/UL
NRBC, AUTO (.00): 0 %
PLATELET # BLD AUTO: 340 K/UL (ref 150–400)
POTASSIUM SERPL-SCNC: 3.9 MMOL/L (ref 3.5–5.1)
PROT SERPL-MCNC: 7.6 G/DL (ref 6.4–8.2)
RBC # BLD AUTO: 4.7 M/UL (ref 4.2–5.4)
SODIUM SERPL-SCNC: 138 MMOL/L (ref 136–145)
T4 FREE SERPL-MCNC: 0.74 NG/DL (ref 0.76–1.46)
TIBC SERPL-MCNC: 340 ΜG/DL (ref 250–450)
TRIGL SERPL-MCNC: 100 MG/DL (ref 35–150)
TSH SERPL DL<=0.005 MIU/L-ACNC: 1.12 UIU/ML (ref 0.36–3.74)
VLDLC SERPL-MCNC: 20 MG/DL
WBC # BLD AUTO: 7.84 K/UL (ref 4.5–11)

## 2024-03-11 PROCEDURE — 84439 ASSAY OF FREE THYROXINE: CPT | Mod: ,,, | Performed by: CLINICAL MEDICAL LABORATORY

## 2024-03-11 PROCEDURE — 83540 ASSAY OF IRON: CPT | Mod: ,,, | Performed by: CLINICAL MEDICAL LABORATORY

## 2024-03-11 PROCEDURE — 83550 IRON BINDING TEST: CPT | Mod: ,,, | Performed by: CLINICAL MEDICAL LABORATORY

## 2024-03-11 PROCEDURE — 90471 IMMUNIZATION ADMIN: CPT | Mod: ,,, | Performed by: NURSE PRACTITIONER

## 2024-03-11 PROCEDURE — 90677 PCV20 VACCINE IM: CPT | Mod: ,,, | Performed by: NURSE PRACTITIONER

## 2024-03-11 PROCEDURE — 82746 ASSAY OF FOLIC ACID SERUM: CPT | Mod: ,,, | Performed by: CLINICAL MEDICAL LABORATORY

## 2024-03-11 PROCEDURE — 82306 VITAMIN D 25 HYDROXY: CPT | Mod: ,,, | Performed by: CLINICAL MEDICAL LABORATORY

## 2024-03-11 PROCEDURE — 83036 HEMOGLOBIN GLYCOSYLATED A1C: CPT | Mod: ,,, | Performed by: CLINICAL MEDICAL LABORATORY

## 2024-03-11 PROCEDURE — 99214 OFFICE O/P EST MOD 30 MIN: CPT | Mod: 25,,, | Performed by: NURSE PRACTITIONER

## 2024-03-11 PROCEDURE — 82607 VITAMIN B-12: CPT | Mod: ,,, | Performed by: CLINICAL MEDICAL LABORATORY

## 2024-03-11 PROCEDURE — 80050 GENERAL HEALTH PANEL: CPT | Mod: ,,, | Performed by: CLINICAL MEDICAL LABORATORY

## 2024-03-11 PROCEDURE — 80061 LIPID PANEL: CPT | Mod: ,,, | Performed by: CLINICAL MEDICAL LABORATORY

## 2024-03-11 RX ORDER — FLUOXETINE HYDROCHLORIDE 40 MG/1
40 CAPSULE ORAL NIGHTLY
Qty: 90 CAPSULE | Refills: 1 | Status: SHIPPED | OUTPATIENT
Start: 2024-03-11

## 2024-03-11 RX ORDER — TOPIRAMATE 50 MG/1
50 TABLET, FILM COATED ORAL NIGHTLY
Qty: 90 TABLET | Refills: 1 | Status: SHIPPED | OUTPATIENT
Start: 2024-03-11

## 2024-03-11 NOTE — PROGRESS NOTES
Hayley Donis DNP   [address] [place] [address]     PATIENT NAME: Esthela Robles  : 1990  DATE: 3/11/24  MRN: [id number]      Billing Provider: Hayley Donis DNP  Level of Service:   Patient PCP Information       Provider PCP Type    Hayley Donis DNP General            Reason for Visit / Chief Complaint: Follow-up (Patient presents to the clinic for 1m f/u ), Fatigue, and Medication Refill       Update PCP  Update Chief Complaint         History of Present Illness / Problem Focused Workflow     Esthela Robles presents to the clinic with Follow-up (Patient presents to the clinic for 1m f/u ), Fatigue, and Medication Refill     Follow-up  Associated symptoms include fatigue.   Fatigue  Associated symptoms include fatigue.   Medication Refill  Associated symptoms include fatigue.       Review of Systems     Review of Systems   Constitutional:  Positive for fatigue.        Medical / Social / Family History     Past Medical History:   Diagnosis Date    Depression        Past Surgical History:   Procedure Laterality Date    CHOLECYSTECTOMY         Social History  Ms.  reports that she has been smoking cigarettes. She started smoking about 16 years ago. She has a 8.0 pack-year smoking history. She has never been exposed to tobacco smoke. She has never used smokeless tobacco. She reports that she does not drink alcohol and does not use drugs.    Family History  Ms.'s family history includes Diabetes in her maternal grandmother and paternal grandfather; No Known Problems in her father and mother.    Medications and Allergies     Medications  No outpatient medications have been marked as taking for the 3/11/24 encounter (Office Visit) with Hayley Donis DNP.       Allergies  Review of patient's allergies indicates:   Allergen Reactions    Codeine Nausea And Vomiting    Hazelnut        Physical Examination   /79 (BP Location: Right arm, Patient Position: Sitting, BP Method:  "Medium (Automatic))   Pulse 91   Temp 98.2 °F (36.8 °C) (Oral)   Resp 20   Ht 5' 6" (1.676 m)   Wt 85.3 kg (188 lb)   LMP 02/28/2024   SpO2 97%   BMI 30.34 kg/m²    Physical Exam  Vitals and nursing note reviewed.   Constitutional:       Appearance: Normal appearance. She is obese.   HENT:      Head: Normocephalic.      Right Ear: Tympanic membrane normal.      Left Ear: Tympanic membrane normal.      Nose: Nose normal.      Mouth/Throat:      Mouth: Mucous membranes are moist.      Pharynx: Oropharynx is clear.   Eyes:      Extraocular Movements: Extraocular movements intact.      Conjunctiva/sclera: Conjunctivae normal.      Pupils: Pupils are equal, round, and reactive to light.   Cardiovascular:      Rate and Rhythm: Normal rate and regular rhythm.      Pulses: Normal pulses.      Heart sounds: Normal heart sounds.   Pulmonary:      Effort: Pulmonary effort is normal.      Breath sounds: Normal breath sounds.   Abdominal:      General: Abdomen is flat. Bowel sounds are normal.      Palpations: Abdomen is soft.   Musculoskeletal:         General: Normal range of motion.      Cervical back: Normal range of motion and neck supple.   Skin:     General: Skin is warm and dry.      Capillary Refill: Capillary refill takes less than 2 seconds.   Neurological:      General: No focal deficit present.      Mental Status: She is alert and oriented to person, place, and time. Mental status is at baseline.   Psychiatric:         Mood and Affect: Mood is anxious.         Speech: Speech normal.         Behavior: Behavior normal. Behavior is cooperative.         Thought Content: Thought content normal.         Judgment: Judgment normal.          Assessment and Plan (including Health Maintenance)      Problem List  Smart Sets  Document Outside HM   :    Plan:     States still feeling fatigue and exhausted  Has twin boys a year old  Still waking up 1-2 times per night  Labs today    Increase prozac and increase topamax at " bedtime take same time every night  See back 1 month   Bp stable.  Pcv given today   Tdap utd.     There are no preventive care reminders to display for this patient.    Problem List Items Addressed This Visit          Psychiatric    Bipolar depression    Panic attacks    Severe anxiety - Primary    Relevant Medications    FLUoxetine 40 MG capsule       Cardiac/Vascular    Encounter for screening for cardiovascular disorders    Relevant Orders    Lipid Panel       ID    Need for vaccination       Endocrine    BMI 30.0-30.9,adult    Hyperglycemia    Relevant Orders    Hemoglobin A1C       Palliative Care    Long term current use of therapeutic drug    Relevant Orders    Vitamin D    Vitamin B12 & Folate    Thyroid Panel       Other    Fatigue    Relevant Orders    Vitamin D    Vitamin B12 & Folate    Thyroid Panel    CBC Auto Differential    Comprehensive Metabolic Panel    Iron and TIBC       Health Maintenance Topics with due status: Not Due       Topic Last Completion Date    TETANUS VACCINE 12/14/2022    Diabetes Urine Screening 06/12/2023    Cervical Cancer Screening Not Due       Future Appointments   Date Time Provider Department Center   4/3/2024  1:15 PM Hayley Donis DNP [facility]   5/23/2024 10:30 AM Amrik Craven DO [place] [facility] [place]            Signature:  Hayley Donis DNP      [address] [place] [address]    Date of encounter: 3/11/24

## 2024-03-12 LAB
EST. AVERAGE GLUCOSE BLD GHB EST-MCNC: 97 MG/DL
HBA1C MFR BLD HPLC: 5 % (ref 4.5–6.6)

## 2024-03-12 NOTE — PROGRESS NOTES
Need vit d 92539 weekly rx x6 months and [facility] wom[facility]VI   Also would have her do complete thyroid panel when she comes back next month

## 2024-03-19 ENCOUNTER — TELEPHONE (OUTPATIENT)
Dept: FAMILY MEDICINE | Facility: CLINIC | Age: 34
End: 2024-03-19
Payer: OTHER GOVERNMENT

## 2024-03-19 LAB
FOLATE SERPL-MCNC: 6.1 NG/ML (ref 3.1–17.5)
VIT B12 SERPL-MCNC: 273 PG/ML (ref 193–986)

## 2024-03-19 NOTE — TELEPHONE ENCOUNTER
----- Message from Hayley Donis, GUILHERME sent at 3/12/2024  8:03 AM CDT -----  Need vit d 82533 weekly rx x6 months and [facility] womenjose MENCHACAI   Also would have her do complete thyroid panel when she comes back next month

## 2024-04-08 ENCOUNTER — OFFICE VISIT (OUTPATIENT)
Dept: FAMILY MEDICINE | Facility: CLINIC | Age: 34
End: 2024-04-08
Payer: OTHER GOVERNMENT

## 2024-04-08 VITALS
DIASTOLIC BLOOD PRESSURE: 69 MMHG | BODY MASS INDEX: 30.31 KG/M2 | TEMPERATURE: 98 F | HEART RATE: 83 BPM | HEIGHT: 66 IN | OXYGEN SATURATION: 98 % | WEIGHT: 188.63 LBS | SYSTOLIC BLOOD PRESSURE: 114 MMHG

## 2024-04-08 DIAGNOSIS — J02.9 SORE THROAT: ICD-10-CM

## 2024-04-08 DIAGNOSIS — F31.9 BIPOLAR DEPRESSION: Primary | ICD-10-CM

## 2024-04-08 DIAGNOSIS — R05.1 ACUTE COUGH: ICD-10-CM

## 2024-04-08 PROBLEM — O98.513 COVID-19 AFFECTING PREGNANCY IN THIRD TRIMESTER: Status: ACTIVE | Noted: 2022-11-28

## 2024-04-08 PROBLEM — O30.019 MONOCHORIONIC AND MONOAMNIOTIC TWIN GESTATION: Status: ACTIVE | Noted: 2022-10-28

## 2024-04-08 PROBLEM — U07.1 COVID-19 AFFECTING PREGNANCY IN THIRD TRIMESTER: Status: ACTIVE | Noted: 2022-11-28

## 2024-04-08 PROBLEM — Q27.0 TWO VESSEL CORD: Status: ACTIVE | Noted: 2022-11-22

## 2024-04-08 PROCEDURE — 99213 OFFICE O/P EST LOW 20 MIN: CPT | Mod: ,,, | Performed by: NURSE PRACTITIONER

## 2024-04-08 RX ORDER — CEFDINIR 300 MG/1
300 CAPSULE ORAL 2 TIMES DAILY
Qty: 14 CAPSULE | Refills: 0 | Status: SHIPPED | OUTPATIENT
Start: 2024-04-08 | End: 2024-04-15

## 2024-04-08 RX ORDER — METHYLPREDNISOLONE 4 MG/1
TABLET ORAL
Qty: 21 TABLET | Refills: 0 | Status: SHIPPED | OUTPATIENT
Start: 2024-04-08

## 2024-04-08 RX ORDER — CARIPRAZINE 1.5 MG/1
1.5 CAPSULE, GELATIN COATED ORAL DAILY
Qty: 90 CAPSULE | Refills: 1 | Status: SHIPPED | OUTPATIENT
Start: 2024-04-08

## 2024-04-08 NOTE — PROGRESS NOTES
"   Hayley Donis DNP   [address] [place] [address]     PATIENT NAME: Esthela Robles  : 1990  DATE: 24  MRN: [id number]      Billing Provider: Hayley Donis DNP  Level of Service:   Patient PCP Information       Provider PCP Type    Hayley Donis DNP General            Reason for Visit / Chief Complaint: Cough and Nasal Congestion       Update PCP  Update Chief Complaint         History of Present Illness / Problem Focused Workflow     Esthela Robles presents to the clinic with Cough and Nasal Congestion     Cough    Review of Systems     Review of Systems   Respiratory:  Positive for cough.       Medical / Social / Family History     Past Medical History:   Diagnosis Date    Depression        Past Surgical History:   Procedure Laterality Date    CHOLECYSTECTOMY         Social History  Ms.  reports that she has been smoking cigarettes. She started smoking about 16 years ago. She has a 8.0 pack-year smoking history. She has never been exposed to tobacco smoke. She has never used smokeless tobacco. She reports that she does not drink alcohol and does not use drugs.    Family History  Ms.'s family history includes Diabetes in her maternal grandmother and paternal grandfather; No Known Problems in her father and mother.    Medications and Allergies     Medications  Outpatient Medications Marked as Taking for the 24 encounter (Office Visit) with Hayley Donis DNP   Medication Sig Dispense Refill    FLUoxetine 40 MG capsule Take 1 capsule (40 mg total) by mouth every evening. 90 capsule 1    topiramate (TOPAMAX) 50 MG tablet Take 1 tablet (50 mg total) by mouth every evening. 90 tablet 1       Allergies  Review of patient's allergies indicates:   Allergen Reactions    Codeine Nausea And Vomiting    Hazelnut        Physical Examination   /69   Pulse 83   Temp 97.8 °F (36.6 °C)   Ht 5' 6" (1.676 m)   Wt 85.5 kg (188 lb 9.6 oz)   LMP 2024   SpO2 98%   " BMI 30.44 kg/m²    Physical Exam  Vitals and nursing note reviewed.   Constitutional:       Appearance: She is obese. She is ill-appearing.   HENT:      Head: Normocephalic.      Right Ear: Tympanic membrane normal.      Left Ear: Tympanic membrane normal.      Nose: Congestion and rhinorrhea present.      Mouth/Throat:      Mouth: Mucous membranes are moist.      Pharynx: Posterior oropharyngeal erythema present.   Eyes:      Extraocular Movements: Extraocular movements intact.      Conjunctiva/sclera: Conjunctivae normal.      Pupils: Pupils are equal, round, and reactive to light.   Cardiovascular:      Rate and Rhythm: Normal rate and regular rhythm.      Pulses: Normal pulses.      Heart sounds: Normal heart sounds.   Pulmonary:      Effort: Pulmonary effort is normal.      Breath sounds: Normal breath sounds.   Musculoskeletal:      Cervical back: Normal range of motion.   Lymphadenopathy:      Cervical: Cervical adenopathy present.   Skin:     General: Skin is warm and dry.      Capillary Refill: Capillary refill takes less than 2 seconds.   Neurological:      General: No focal deficit present.      Mental Status: She is alert and oriented to person, place, and time. Mental status is at baseline.   Psychiatric:         Mood and Affect: Mood normal.         Behavior: Behavior normal.         Thought Content: Thought content normal.         Judgment: Judgment normal.        Assessment and Plan (including Health Maintenance)      Problem List  Smart Sets  Document Outside HM   :    Plan:         There are no preventive care reminders to display for this patient.    Problem List Items Addressed This Visit          Psychiatric    Bipolar depression - Primary       ENT    Sore throat       Pulmonary    Cough       Endocrine    BMI 30.0-30.9,adult       Health Maintenance Topics with due status: Not Due       Topic Last Completion Date    TETANUS VACCINE 12/14/2022    Diabetes Urine Screening 06/12/2023     Hemoglobin A1c 03/11/2024    Cervical Cancer Screening Not Due       Future Appointments   Date Time Provider Department Center   4/8/2024  3:30 PM Hayley Donis DNP [facility]   5/16/2024  9:30 AM Amrik Craven DO [facility]            Signature:  Hayley Donis DNP      [address] [place] [address]    Date of encounter: 4/8/24

## 2024-05-13 ENCOUNTER — PATIENT MESSAGE (OUTPATIENT)
Dept: FAMILY MEDICINE | Facility: CLINIC | Age: 34
End: 2024-05-13
Payer: OTHER GOVERNMENT

## 2024-05-13 RX ORDER — OMEPRAZOLE 40 MG/1
40 CAPSULE, DELAYED RELEASE ORAL DAILY
Qty: 90 CAPSULE | Refills: 3 | Status: SHIPPED | OUTPATIENT
Start: 2024-05-13 | End: 2025-05-13

## 2024-06-10 PROBLEM — Z13.6 ENCOUNTER FOR SCREENING FOR CARDIOVASCULAR DISORDERS: Status: RESOLVED | Noted: 2022-03-28 | Resolved: 2024-06-10

## 2024-06-25 ENCOUNTER — TELEPHONE (OUTPATIENT)
Dept: FAMILY MEDICINE | Facility: CLINIC | Age: 34
End: 2024-06-25
Payer: OTHER GOVERNMENT

## 2024-06-26 ENCOUNTER — PATIENT OUTREACH (OUTPATIENT)
Facility: HOSPITAL | Age: 34
End: 2024-06-26
Payer: OTHER GOVERNMENT

## 2024-06-26 NOTE — PROGRESS NOTES
Population Health Chart Review & Patient Outreach Details    Updates Requested / Reviewed:  [x]  Care Team Updated  []  Care Everywhere Updated & Reviewed  []  [facility] Reviewed  []   Reviewed  []  [facility] Reviewed    Health Maintenance Topics Addressed and Outreach Outcomes / Actions Taken:        Cervical Cancer Screening []  Pap Smear Scheduled in Primary Care or OBGYN    [x]  External Records Requested & Care Team Updated if Applicable       []  External Records Uploaded, Care Team Updated, & History Updated if Applicable    []  Patient Declined Scheduling Pap Smear    []  Patient Will Schedule with External Provider & Care Team Updated if Applicable

## 2024-06-27 ENCOUNTER — PATIENT OUTREACH (OUTPATIENT)
Facility: HOSPITAL | Age: 34
End: 2024-06-27
Payer: OTHER GOVERNMENT

## 2024-06-27 NOTE — PROGRESS NOTES
Cervical Cancer Screening []  Pap Smear Scheduled in Primary Care or OBGYN    []  External Records Requested & Care Team Updated if Applicable       [x]  External Records Uploaded, Care Team Updated, & History Updated if Applicable PAP from [facility] receives and uploaded    []  Patient Declined Scheduling Pap Smear    []  Patient Will Schedule with External Provider & Care Team Updated if Applicable

## 2024-07-02 ENCOUNTER — TELEPHONE (OUTPATIENT)
Dept: NEUROLOGY | Facility: CLINIC | Age: 34
End: 2024-07-02
Payer: OTHER GOVERNMENT

## 2024-08-09 ENCOUNTER — OFFICE VISIT (OUTPATIENT)
Dept: FAMILY MEDICINE | Facility: CLINIC | Age: 34
End: 2024-08-09
Payer: OTHER GOVERNMENT

## 2024-08-09 VITALS
HEIGHT: 66 IN | TEMPERATURE: 98 F | DIASTOLIC BLOOD PRESSURE: 77 MMHG | WEIGHT: 186.19 LBS | OXYGEN SATURATION: 98 % | HEART RATE: 96 BPM | SYSTOLIC BLOOD PRESSURE: 120 MMHG | BODY MASS INDEX: 29.92 KG/M2

## 2024-08-09 DIAGNOSIS — J01.00 ACUTE NON-RECURRENT MAXILLARY SINUSITIS: Primary | ICD-10-CM

## 2024-08-09 DIAGNOSIS — R05.1 ACUTE COUGH: ICD-10-CM

## 2024-08-09 DIAGNOSIS — R09.81 NASAL CONGESTION: ICD-10-CM

## 2024-08-09 RX ORDER — METHYLPREDNISOLONE 4 MG/1
TABLET ORAL
Qty: 21 TABLET | Refills: 0 | Status: SHIPPED | OUTPATIENT
Start: 2024-08-09

## 2024-08-09 RX ORDER — AZITHROMYCIN 250 MG/1
TABLET, FILM COATED ORAL
Qty: 6 TABLET | Refills: 0 | Status: SHIPPED | OUTPATIENT
Start: 2024-08-09

## 2024-08-09 RX ORDER — FLUTICASONE PROPIONATE 50 MCG
1 SPRAY, SUSPENSION (ML) NASAL DAILY
Qty: 11.1 ML | Refills: 0 | Status: SHIPPED | OUTPATIENT
Start: 2024-08-09

## 2024-08-21 ENCOUNTER — OFFICE VISIT (OUTPATIENT)
Dept: FAMILY MEDICINE | Facility: CLINIC | Age: 34
End: 2024-08-21
Payer: OTHER GOVERNMENT

## 2024-08-21 VITALS
OXYGEN SATURATION: 98 % | HEART RATE: 71 BPM | DIASTOLIC BLOOD PRESSURE: 72 MMHG | WEIGHT: 188.19 LBS | TEMPERATURE: 98 F | SYSTOLIC BLOOD PRESSURE: 107 MMHG | BODY MASS INDEX: 30.24 KG/M2 | HEIGHT: 66 IN

## 2024-08-21 DIAGNOSIS — R05.1 ACUTE COUGH: ICD-10-CM

## 2024-08-21 DIAGNOSIS — F31.9 BIPOLAR DEPRESSION: ICD-10-CM

## 2024-08-21 DIAGNOSIS — R42 DIZZINESS: ICD-10-CM

## 2024-08-21 DIAGNOSIS — H92.01 ACUTE OTALGIA, RIGHT: ICD-10-CM

## 2024-08-21 DIAGNOSIS — F41.9 SEVERE ANXIETY: ICD-10-CM

## 2024-08-21 DIAGNOSIS — J01.00 ACUTE NON-RECURRENT MAXILLARY SINUSITIS: Primary | ICD-10-CM

## 2024-08-21 PROBLEM — U07.1 COVID-19 AFFECTING PREGNANCY IN THIRD TRIMESTER: Status: RESOLVED | Noted: 2022-11-28 | Resolved: 2024-08-21

## 2024-08-21 PROBLEM — Q27.0 TWO VESSEL CORD: Status: RESOLVED | Noted: 2022-11-22 | Resolved: 2024-08-21

## 2024-08-21 PROBLEM — O99.341 ANXIETY DURING PREGNANCY IN FIRST TRIMESTER, ANTEPARTUM: Status: RESOLVED | Noted: 2022-07-18 | Resolved: 2024-08-21

## 2024-08-21 PROBLEM — R73.9 HYPERGLYCEMIA: Status: RESOLVED | Noted: 2024-03-11 | Resolved: 2024-08-21

## 2024-08-21 PROBLEM — O30.019 MONOCHORIONIC AND MONOAMNIOTIC TWIN GESTATION: Status: RESOLVED | Noted: 2022-10-28 | Resolved: 2024-08-21

## 2024-08-21 PROBLEM — O98.513 COVID-19 AFFECTING PREGNANCY IN THIRD TRIMESTER: Status: RESOLVED | Noted: 2022-11-28 | Resolved: 2024-08-21

## 2024-08-21 PROCEDURE — 99213 OFFICE O/P EST LOW 20 MIN: CPT | Mod: ,,, | Performed by: NURSE PRACTITIONER

## 2024-08-21 RX ORDER — DOXYCYCLINE 100 MG/1
100 CAPSULE ORAL 2 TIMES DAILY
Qty: 14 CAPSULE | Refills: 0 | Status: SHIPPED | OUTPATIENT
Start: 2024-08-21 | End: 2024-08-28

## 2024-08-21 RX ORDER — TOPIRAMATE 100 MG/1
100 TABLET, FILM COATED ORAL DAILY
Qty: 90 TABLET | Refills: 1 | Status: SHIPPED | OUTPATIENT
Start: 2024-08-21 | End: 2025-08-21

## 2024-08-21 RX ORDER — BUPROPION HYDROCHLORIDE 75 MG/1
75 TABLET ORAL DAILY
Qty: 30 TABLET | Refills: 3 | Status: SHIPPED | OUTPATIENT
Start: 2024-08-21 | End: 2025-08-21

## 2024-08-21 RX ORDER — METHYLPREDNISOLONE 4 MG/1
TABLET ORAL
Qty: 21 TABLET | Refills: 0 | Status: SHIPPED | OUTPATIENT
Start: 2024-08-21

## 2024-08-21 NOTE — PROGRESS NOTES
Hayley Donis DNP   [address] [place] [address]     PATIENT NAME: Esthela Robles  : 1990  DATE: 24  MRN: [id number]      Billing Provider: Hayley Donis DNP  Level of Service:   Patient PCP Information       Provider PCP Type    Hayley Donis DNP General            Reason for Visit / Chief Complaint: Headache (Patient complains of headache for the past week.) and Nasal Congestion       Update PCP  Update Chief Complaint         History of Present Illness / Problem Focused Workflow     Esthela Robles presents to the clinic with Headache (Patient complains of headache for the past week.) and Nasal Congestion     Headache         Review of Systems     Review of Systems   Neurological:  Positive for headaches.        Medical / Social / Family History     Past Medical History:   Diagnosis Date    Depression        Past Surgical History:   Procedure Laterality Date    CHOLECYSTECTOMY         Social History  Ms.  reports that she has been smoking cigarettes. She started smoking about 16 years ago. She has a 8.2 pack-year smoking history. She has never been exposed to tobacco smoke. She has never used smokeless tobacco. She reports that she does not drink alcohol and does not use drugs.    Family History  Ms.'s family history includes Diabetes in her maternal grandmother and paternal grandfather; No Known Problems in her father and mother.    Medications and Allergies     Medications  Outpatient Medications Marked as Taking for the 24 encounter (Office Visit) with Hayley Donis DNP   Medication Sig Dispense Refill    omeprazole (PRILOSEC) 40 MG capsule Take 1 capsule (40 mg total) by mouth once daily. 90 capsule 3    topiramate (TOPAMAX) 50 MG tablet Take 1 tablet (50 mg total) by mouth every evening. 90 tablet 1       Allergies  Review of patient's allergies indicates:   Allergen Reactions    Codeine Nausea And Vomiting    Hazelnut        Physical Examination   /72  "(BP Location: Right arm, Patient Position: Sitting, BP Method: Large (Automatic))   Pulse 71   Temp 97.6 °F (36.4 °C) (Oral)   Ht 5' 6" (1.676 m)   Wt 85.4 kg (188 lb 3.2 oz)   LMP 08/14/2024 (Approximate)   SpO2 98%   BMI 30.38 kg/m²    Physical Exam  Vitals and nursing note reviewed.   Constitutional:       Appearance: She is ill-appearing.   HENT:      Head: Normocephalic.      Left Ear: Tympanic membrane normal.      Ears:      Comments: Dull tm with fluid on rt tm      Nose: Congestion and rhinorrhea present.      Mouth/Throat:      Mouth: Mucous membranes are moist.      Pharynx: Posterior oropharyngeal erythema present.   Eyes:      Extraocular Movements: Extraocular movements intact.      Conjunctiva/sclera: Conjunctivae normal.      Pupils: Pupils are equal, round, and reactive to light.   Cardiovascular:      Rate and Rhythm: Normal rate and regular rhythm.      Pulses: Normal pulses.      Heart sounds: Normal heart sounds.   Pulmonary:      Effort: Pulmonary effort is normal.      Breath sounds: Normal breath sounds.   Musculoskeletal:         General: Normal range of motion.      Cervical back: Normal range of motion.   Lymphadenopathy:      Cervical: Cervical adenopathy present.   Skin:     General: Skin is warm and dry.      Capillary Refill: Capillary refill takes less than 2 seconds.   Neurological:      General: No focal deficit present.      Mental Status: She is alert and oriented to person, place, and time. Mental status is at baseline.   Psychiatric:         Mood and Affect: Mood normal.         Behavior: Behavior normal.         Thought Content: Thought content normal.         Judgment: Judgment normal.          Assessment and Plan (including Health Maintenance)      Problem List  Smart Sets  Document Outside HM   :    Plan:     Treat sinusitis did not resolve last office visit  States still having migraines. Requesting increase in her topamax taking the morning states she is more awake " and alert at night if she takes it then  Also does not want to take vraylar or prozac - she is still smoking/vaping  Never tried wellbutrin. Try low dose daily then can titrate to bid or even XR   1 month fu appt     Health Maintenance Due   Topic Date Due    Diabetes Urine Screening  06/12/2024    Hemoglobin A1c  09/11/2024       Problem List Items Addressed This Visit          Psychiatric    Bipolar depression    Severe anxiety       ENT    Maxillary sinusitis - Primary    Acute otalgia, right       Pulmonary    Cough       Endocrine    BMI 30.0-30.9,adult       Other    Dizziness       Health Maintenance Topics with due status: Not Due       Topic Last Completion Date    TETANUS VACCINE 12/14/2022    Cervical Cancer Screening 02/13/2023    Influenza Vaccine 03/11/2024       No future appointments.         Signature:  Hayley Donis, GUILHERME      [address] [place] [address]    Date of encounter: 8/21/24

## 2024-09-16 ENCOUNTER — OFFICE VISIT (OUTPATIENT)
Dept: FAMILY MEDICINE | Facility: CLINIC | Age: 34
End: 2024-09-16
Payer: OTHER GOVERNMENT

## 2024-09-16 VITALS
BODY MASS INDEX: 30.64 KG/M2 | OXYGEN SATURATION: 98 % | WEIGHT: 190.63 LBS | HEIGHT: 66 IN | TEMPERATURE: 98 F | SYSTOLIC BLOOD PRESSURE: 115 MMHG | HEART RATE: 67 BPM | DIASTOLIC BLOOD PRESSURE: 74 MMHG

## 2024-09-16 DIAGNOSIS — F41.9 SEVERE ANXIETY: ICD-10-CM

## 2024-09-16 DIAGNOSIS — F41.0 PANIC ATTACKS: ICD-10-CM

## 2024-09-16 DIAGNOSIS — M79.89 SWELLING OF RIGHT HAND: Primary | ICD-10-CM

## 2024-09-16 DIAGNOSIS — F31.9 BIPOLAR DEPRESSION: ICD-10-CM

## 2024-09-16 PROCEDURE — 96372 THER/PROPH/DIAG INJ SC/IM: CPT | Mod: ,,, | Performed by: NURSE PRACTITIONER

## 2024-09-16 PROCEDURE — 99213 OFFICE O/P EST LOW 20 MIN: CPT | Mod: 25,,, | Performed by: NURSE PRACTITIONER

## 2024-09-16 RX ORDER — METHYLPREDNISOLONE 4 MG/1
TABLET ORAL
Qty: 21 TABLET | Refills: 0 | Status: SHIPPED | OUTPATIENT
Start: 2024-09-16

## 2024-09-16 RX ORDER — METHYLPREDNISOLONE ACETATE 40 MG/ML
40 INJECTION, SUSPENSION INTRA-ARTICULAR; INTRALESIONAL; INTRAMUSCULAR; SOFT TISSUE
Status: COMPLETED | OUTPATIENT
Start: 2024-09-16 | End: 2024-09-16

## 2024-09-16 RX ORDER — BUPROPION HYDROCHLORIDE 75 MG/1
150 TABLET ORAL DAILY
Qty: 60 TABLET | Refills: 1 | Status: SHIPPED | OUTPATIENT
Start: 2024-09-16 | End: 2025-09-16

## 2024-09-16 RX ORDER — DEXAMETHASONE SODIUM PHOSPHATE 4 MG/ML
4 INJECTION, SOLUTION INTRA-ARTICULAR; INTRALESIONAL; INTRAMUSCULAR; INTRAVENOUS; SOFT TISSUE
Status: COMPLETED | OUTPATIENT
Start: 2024-09-16 | End: 2024-09-16

## 2024-09-16 RX ADMIN — METHYLPREDNISOLONE ACETATE 40 MG: 40 INJECTION, SUSPENSION INTRA-ARTICULAR; INTRALESIONAL; INTRAMUSCULAR; SOFT TISSUE at 09:09

## 2024-09-16 RX ADMIN — DEXAMETHASONE SODIUM PHOSPHATE 4 MG: 4 INJECTION, SOLUTION INTRA-ARTICULAR; INTRALESIONAL; INTRAMUSCULAR; INTRAVENOUS; SOFT TISSUE at 09:09

## 2024-09-16 NOTE — PROGRESS NOTES
Hayley Donis DNP   [address] [place] [address]     PATIENT NAME: Esthela Robles  : 1990  DATE: 24  MRN: [id number]      Billing Provider: Hayley Donis DNP  Level of Service:   Patient PCP Information       Provider PCP Type    Hayley Donis DNP General            Reason for Visit / Chief Complaint: Edema (Patient complains of right hand swollen, tingling, and tightness since Saturday. )       Update PCP  Update Chief Complaint         History of Present Illness / Problem Focused Workflow     Esthela Robles presents to the clinic with Edema (Patient complains of right hand swollen, tingling, and tightness since Saturday. )     HPI    Review of Systems     Review of Systems     Medical / Social / Family History     Past Medical History:   Diagnosis Date    Depression     GERD (gastroesophageal reflux disease)        Past Surgical History:   Procedure Laterality Date    CHOLECYSTECTOMY         Social History  Ms.  reports that she has been smoking cigarettes. She started smoking about 16 years ago. She has a 8.3 pack-year smoking history. She has never been exposed to tobacco smoke. She has never used smokeless tobacco. She reports that she does not drink alcohol and does not use drugs.    Family History  Ms.'s family history includes Diabetes in her maternal grandmother and paternal grandfather; No Known Problems in her father and mother.    Medications and Allergies     Medications  Outpatient Medications Marked as Taking for the 24 encounter (Office Visit) with Hayley Donis DNP   Medication Sig Dispense Refill    omeprazole (PRILOSEC) 40 MG capsule Take 1 capsule (40 mg total) by mouth once daily. 90 capsule 3    topiramate (TOPAMAX) 100 MG tablet Take 1 tablet (100 mg total) by mouth once daily. 90 tablet 1    [DISCONTINUED] buPROPion (WELLBUTRIN) 75 MG tablet Take 1 tablet (75 mg total) by mouth once daily. 30 tablet 3     Current Facility-Administered  "Medications for the 9/16/24 encounter (Office Visit) with Hayley Donis DNP   Medication Dose Route Frequency Provider Last Rate Last Admin    [COMPLETED] dexAMETHasone injection 4 mg  4 mg Intramuscular 1 time in Clinic/HOD Hayley Donis DNP   4 mg at 09/16/24 0900    [COMPLETED] methylPREDNISolone acetate injection 40 mg  40 mg Intramuscular 1 time in Clinic/HOD Hayley Donis DNP   40 mg at 09/16/24 0900       Allergies  Review of patient's allergies indicates:   Allergen Reactions    Codeine Nausea And Vomiting    Hazelnut        Physical Examination   /74 (BP Location: Right arm, Patient Position: Sitting, BP Method: Large (Automatic))   Pulse 67   Temp 97.8 °F (36.6 °C) (Oral)   Ht 5' 6" (1.676 m)   Wt 86.5 kg (190 lb 9.6 oz)   LMP 09/09/2024 (Approximate)   SpO2 98%   BMI 30.76 kg/m²    Physical Exam  Vitals and nursing note reviewed.   Constitutional:       Appearance: Normal appearance. She is obese.   HENT:      Head: Normocephalic.      Right Ear: Tympanic membrane normal.      Left Ear: Tympanic membrane normal.      Nose: Nose normal.      Mouth/Throat:      Mouth: Mucous membranes are moist.      Pharynx: Oropharynx is clear.   Eyes:      Extraocular Movements: Extraocular movements intact.      Conjunctiva/sclera: Conjunctivae normal.      Pupils: Pupils are equal, round, and reactive to light.   Cardiovascular:      Rate and Rhythm: Normal rate and regular rhythm.      Pulses: Normal pulses.      Heart sounds: Normal heart sounds.   Pulmonary:      Effort: Pulmonary effort is normal.      Breath sounds: Normal breath sounds.   Abdominal:      General: Abdomen is flat. Bowel sounds are normal.      Palpations: Abdomen is soft.   Musculoskeletal:         General: Swelling and tenderness present.      Cervical back: Normal range of motion and neck supple.      Comments: Rt hand and fingers.    Skin:     General: Skin is warm and dry.      Capillary Refill: Capillary refill " takes less than 2 seconds.   Neurological:      General: No focal deficit present.      Mental Status: She is alert and oriented to person, place, and time. Mental status is at baseline.   Psychiatric:         Mood and Affect: Mood is anxious.         Speech: Speech normal.         Behavior: Behavior normal. Behavior is cooperative.         Thought Content: Thought content normal.         Judgment: Judgment normal.        Assessment and Plan (including Health Maintenance)      Problem List  Smart Sets  Document Outside HM   :    Plan:   Reports still feeling irritable and anxious - requesting to increase her wellbutrin  Tolerating topamax well.   States takes them both in morning or else she's wide awake at night  Drove home from [place] on Friday and noticed her rt hand and fingers swollen compared to other extremities  Denies bite or redness or warmth    I'm injection given   Increase wellbutrin   See back 1-3 months       Health Maintenance Due   Topic Date Due    Diabetes Urine Screening  06/12/2024    Hemoglobin A1c  09/11/2024       Problem List Items Addressed This Visit          Psychiatric    Bipolar depression    Panic attacks    Severe anxiety       Orthopedic    Swelling of right hand - Primary       Health Maintenance Topics with due status: Not Due       Topic Last Completion Date    TETANUS VACCINE 12/14/2022    Cervical Cancer Screening 02/13/2023       No future appointments.         Signature:  Hayley Donis, GUILHERME      [address] [place] [address]    Date of encounter: 9/16/24

## 2024-09-25 ENCOUNTER — PATIENT MESSAGE (OUTPATIENT)
Dept: FAMILY MEDICINE | Facility: CLINIC | Age: 34
End: 2024-09-25
Payer: OTHER GOVERNMENT

## 2024-10-10 ENCOUNTER — OFFICE VISIT (OUTPATIENT)
Dept: FAMILY MEDICINE | Facility: CLINIC | Age: 34
End: 2024-10-10
Payer: OTHER GOVERNMENT

## 2024-10-10 VITALS
BODY MASS INDEX: 29.7 KG/M2 | WEIGHT: 184.81 LBS | HEIGHT: 66 IN | SYSTOLIC BLOOD PRESSURE: 117 MMHG | DIASTOLIC BLOOD PRESSURE: 79 MMHG | HEART RATE: 68 BPM | TEMPERATURE: 98 F | OXYGEN SATURATION: 97 %

## 2024-10-10 DIAGNOSIS — M79.89 SWELLING OF RIGHT HAND: ICD-10-CM

## 2024-10-10 DIAGNOSIS — R73.9 HYPERGLYCEMIA: ICD-10-CM

## 2024-10-10 DIAGNOSIS — Z13.6 ENCOUNTER FOR SCREENING FOR CARDIOVASCULAR DISORDERS: ICD-10-CM

## 2024-10-10 DIAGNOSIS — M25.541 JOINT PAIN IN FINGERS OF RIGHT HAND: Primary | ICD-10-CM

## 2024-10-10 DIAGNOSIS — F32.2 SEVERE DEPRESSION: ICD-10-CM

## 2024-10-10 DIAGNOSIS — Z79.899 LONG TERM CURRENT USE OF THERAPEUTIC DRUG: ICD-10-CM

## 2024-10-10 LAB
ALBUMIN SERPL BCP-MCNC: 4 G/DL (ref 3.5–5)
ALBUMIN/GLOB SERPL: 1.1 {RATIO}
ALP SERPL-CCNC: 64 U/L (ref 37–98)
ALT SERPL W P-5'-P-CCNC: 15 U/L (ref 13–56)
ANION GAP SERPL CALCULATED.3IONS-SCNC: 8 MMOL/L (ref 7–16)
AST SERPL W P-5'-P-CCNC: 19 U/L (ref 15–37)
BASOPHILS # BLD AUTO: 0.12 K/UL (ref 0–0.2)
BASOPHILS NFR BLD AUTO: 1.6 % (ref 0–1)
BILIRUB SERPL-MCNC: 0.5 MG/DL (ref ?–1.2)
BUN SERPL-MCNC: 8 MG/DL (ref 7–18)
BUN/CREAT SERPL: 10 (ref 6–20)
CALCIUM SERPL-MCNC: 8.8 MG/DL (ref 8.5–10.1)
CHLORIDE SERPL-SCNC: 108 MMOL/L (ref 98–107)
CHOLEST SERPL-MCNC: 193 MG/DL (ref 0–200)
CHOLEST/HDLC SERPL: 2.8 {RATIO}
CO2 SERPL-SCNC: 25 MMOL/L (ref 21–32)
CREAT SERPL-MCNC: 0.8 MG/DL (ref 0.55–1.02)
CREAT UR-MCNC: 40 MG/DL (ref 28–219)
CRP SERPL-MCNC: <0.29 MG/DL (ref 0–0.8)
DIFFERENTIAL METHOD BLD: ABNORMAL
EGFR (NO RACE VARIABLE) (RUSH/TITUS): 99 ML/MIN/1.73M2
EOSINOPHIL # BLD AUTO: 0.33 K/UL (ref 0–0.5)
EOSINOPHIL NFR BLD AUTO: 4.5 % (ref 1–4)
ERYTHROCYTE [DISTWIDTH] IN BLOOD BY AUTOMATED COUNT: 14.1 % (ref 11.5–14.5)
ERYTHROCYTE [SEDIMENTATION RATE] IN BLOOD BY WESTERGREN METHOD: 2 MM/HR (ref 0–20)
EST. AVERAGE GLUCOSE BLD GHB EST-MCNC: 88 MG/DL
GLOBULIN SER-MCNC: 3.6 G/DL (ref 2–4)
GLUCOSE SERPL-MCNC: 70 MG/DL (ref 74–106)
HBA1C MFR BLD HPLC: 4.7 % (ref 4.5–6.6)
HCT VFR BLD AUTO: 43.4 % (ref 38–47)
HDLC SERPL-MCNC: 69 MG/DL (ref 40–60)
HGB BLD-MCNC: 13.8 G/DL (ref 12–16)
IMM GRANULOCYTES # BLD AUTO: 0.03 K/UL (ref 0–0.04)
IMM GRANULOCYTES NFR BLD: 0.4 % (ref 0–0.4)
LDLC SERPL CALC-MCNC: 108 MG/DL
LDLC/HDLC SERPL: 1.6 {RATIO}
LYMPHOCYTES # BLD AUTO: 2.79 K/UL (ref 1–4.8)
LYMPHOCYTES NFR BLD AUTO: 38 % (ref 27–41)
MCH RBC QN AUTO: 29.7 PG (ref 27–31)
MCHC RBC AUTO-ENTMCNC: 31.8 G/DL (ref 32–36)
MCV RBC AUTO: 93.5 FL (ref 80–96)
MICROALBUMIN UR-MCNC: <0.5 MG/DL (ref 0–2.8)
MICROALBUMIN/CREAT RATIO PNL UR: <12.5 MG/G (ref 0–30)
MONOCYTES # BLD AUTO: 0.66 K/UL (ref 0–0.8)
MONOCYTES NFR BLD AUTO: 9 % (ref 2–6)
MPC BLD CALC-MCNC: 10.6 FL (ref 9.4–12.4)
NEUTROPHILS # BLD AUTO: 3.41 K/UL (ref 1.8–7.7)
NEUTROPHILS NFR BLD AUTO: 46.5 % (ref 53–65)
NONHDLC SERPL-MCNC: 124 MG/DL
NRBC # BLD AUTO: 0 X10E3/UL
NRBC, AUTO (.00): 0 %
PLATELET # BLD AUTO: 297 K/UL (ref 150–400)
POTASSIUM SERPL-SCNC: 3.9 MMOL/L (ref 3.5–5.1)
PROT SERPL-MCNC: 7.6 G/DL (ref 6.4–8.2)
RBC # BLD AUTO: 4.64 M/UL (ref 4.2–5.4)
RHEUMATOID FACT SER NEPH-ACNC: <10 IU/ML (ref 0–15)
RHEUMATOID FACT SER NEPH-ACNC: NEGATIVE [IU]/ML
SODIUM SERPL-SCNC: 137 MMOL/L (ref 136–145)
TRIGL SERPL-MCNC: 79 MG/DL (ref 35–150)
URATE SERPL-MCNC: 2.5 MG/DL (ref 2.6–6)
VLDLC SERPL-MCNC: 16 MG/DL
WBC # BLD AUTO: 7.34 K/UL (ref 4.5–11)

## 2024-10-10 PROCEDURE — 84550 ASSAY OF BLOOD/URIC ACID: CPT | Mod: ,,, | Performed by: CLINICAL MEDICAL LABORATORY

## 2024-10-10 PROCEDURE — 85651 RBC SED RATE NONAUTOMATED: CPT | Mod: ,,, | Performed by: CLINICAL MEDICAL LABORATORY

## 2024-10-10 PROCEDURE — 80053 COMPREHEN METABOLIC PANEL: CPT | Mod: ,,, | Performed by: CLINICAL MEDICAL LABORATORY

## 2024-10-10 PROCEDURE — 99213 OFFICE O/P EST LOW 20 MIN: CPT | Mod: 25,,, | Performed by: NURSE PRACTITIONER

## 2024-10-10 PROCEDURE — 86431 RHEUMATOID FACTOR QUANT: CPT | Mod: ,,, | Performed by: CLINICAL MEDICAL LABORATORY

## 2024-10-10 PROCEDURE — 83036 HEMOGLOBIN GLYCOSYLATED A1C: CPT | Mod: ,,, | Performed by: CLINICAL MEDICAL LABORATORY

## 2024-10-10 PROCEDURE — 96372 THER/PROPH/DIAG INJ SC/IM: CPT | Mod: ,,, | Performed by: NURSE PRACTITIONER

## 2024-10-10 PROCEDURE — 86430 RHEUMATOID FACTOR TEST QUAL: CPT | Mod: ,,, | Performed by: CLINICAL MEDICAL LABORATORY

## 2024-10-10 PROCEDURE — 86140 C-REACTIVE PROTEIN: CPT | Mod: ,,, | Performed by: CLINICAL MEDICAL LABORATORY

## 2024-10-10 PROCEDURE — 85025 COMPLETE CBC W/AUTO DIFF WBC: CPT | Mod: ,,, | Performed by: CLINICAL MEDICAL LABORATORY

## 2024-10-10 PROCEDURE — 82570 ASSAY OF URINE CREATININE: CPT | Mod: ,,, | Performed by: CLINICAL MEDICAL LABORATORY

## 2024-10-10 PROCEDURE — 82043 UR ALBUMIN QUANTITATIVE: CPT | Mod: ,,, | Performed by: CLINICAL MEDICAL LABORATORY

## 2024-10-10 PROCEDURE — 80061 LIPID PANEL: CPT | Mod: ,,, | Performed by: CLINICAL MEDICAL LABORATORY

## 2024-10-10 RX ORDER — BETAMETHASONE SODIUM PHOSPHATE AND BETAMETHASONE ACETATE 3; 3 MG/ML; MG/ML
6 INJECTION, SUSPENSION INTRA-ARTICULAR; INTRALESIONAL; INTRAMUSCULAR; SOFT TISSUE
Status: COMPLETED | OUTPATIENT
Start: 2024-10-10 | End: 2024-10-10

## 2024-10-10 RX ORDER — KETOROLAC TROMETHAMINE 30 MG/ML
60 INJECTION, SOLUTION INTRAMUSCULAR; INTRAVENOUS
Status: COMPLETED | OUTPATIENT
Start: 2024-10-10 | End: 2024-10-10

## 2024-10-10 RX ADMIN — KETOROLAC TROMETHAMINE 60 MG: 30 INJECTION, SOLUTION INTRAMUSCULAR; INTRAVENOUS at 08:10

## 2024-10-10 RX ADMIN — BETAMETHASONE SODIUM PHOSPHATE AND BETAMETHASONE ACETATE 6 MG: 3; 3 INJECTION, SUSPENSION INTRA-ARTICULAR; INTRALESIONAL; INTRAMUSCULAR; SOFT TISSUE at 08:10

## 2024-10-11 PROBLEM — M25.541 JOINT PAIN IN FINGERS OF RIGHT HAND: Status: ACTIVE | Noted: 2024-10-11

## 2024-10-11 NOTE — PROGRESS NOTES
Hayley Donis DNP   [address] [place] [address]     PATIENT NAME: Esthela Robles  : 1990  DATE: 10/10/24  MRN: [id number]      Billing Provider: Hayley Donis DNP  Level of Service:   Patient PCP Information       Provider PCP Type    Hayley Donis DNP General            Reason for Visit / Chief Complaint: Hand Pain (Patient complains of pain and unable to move ring finger on right hand.)       Update PCP  Update Chief Complaint         History of Present Illness / Problem Focused Workflow     Esthela Robles presents to the clinic with Hand Pain (Patient complains of pain and unable to move ring finger on right hand.)     Hand Pain     Review of Systems     Review of Systems     Medical / Social / Family History     Past Medical History:   Diagnosis Date    Depression     GERD (gastroesophageal reflux disease)        Past Surgical History:   Procedure Laterality Date    CHOLECYSTECTOMY         Social History  Ms.  reports that she has been smoking cigarettes. She started smoking about 16 years ago. She has a 8.3 pack-year smoking history. She has never been exposed to tobacco smoke. She has never used smokeless tobacco. She reports that she does not drink alcohol and does not use drugs.    Family History  Ms.'s family history includes Diabetes in her maternal grandmother and paternal grandfather; No Known Problems in her father and mother.    Medications and Allergies     Medications  Outpatient Medications Marked as Taking for the 10/10/24 encounter (Office Visit) with Hayley Donis DNP   Medication Sig Dispense Refill    buPROPion (WELLBUTRIN) 75 MG tablet Take 2 tablets (150 mg total) by mouth once daily. 60 tablet 1    omeprazole (PRILOSEC) 40 MG capsule Take 1 capsule (40 mg total) by mouth once daily. 90 capsule 3    topiramate (TOPAMAX) 100 MG tablet Take 1 tablet (100 mg total) by mouth once daily. 90 tablet 1       Allergies  Review of patient's allergies  "indicates:   Allergen Reactions    Codeine Nausea And Vomiting    Hazelnut        Physical Examination   /79   Pulse 68   Temp 98.4 °F (36.9 °C) (Oral)   Ht 5' 6" (1.676 m)   Wt 83.8 kg (184 lb 12.8 oz)   LMP 09/09/2024 (Exact Date)   SpO2 97%   BMI 29.83 kg/m²    Physical Exam  Vitals and nursing note reviewed.   Constitutional:       Appearance: Normal appearance. She is obese.   HENT:      Head: Normocephalic.      Right Ear: Tympanic membrane normal.      Left Ear: Tympanic membrane normal.      Nose: Nose normal.      Mouth/Throat:      Mouth: Mucous membranes are moist.      Pharynx: Oropharynx is clear.   Eyes:      Extraocular Movements: Extraocular movements intact.      Conjunctiva/sclera: Conjunctivae normal.      Pupils: Pupils are equal, round, and reactive to light.   Cardiovascular:      Rate and Rhythm: Normal rate and regular rhythm.      Pulses: Normal pulses.      Heart sounds: Normal heart sounds.   Pulmonary:      Effort: Pulmonary effort is normal.      Breath sounds: Normal breath sounds.   Abdominal:      General: Abdomen is flat. Bowel sounds are normal.      Palpations: Abdomen is soft.   Musculoskeletal:         General: Swelling and tenderness present.      Cervical back: Normal range of motion and neck supple.      Comments: Rt hand and fingers.    Skin:     General: Skin is warm and dry.      Capillary Refill: Capillary refill takes less than 2 seconds.   Neurological:      General: No focal deficit present.      Mental Status: She is alert and oriented to person, place, and time. Mental status is at baseline.   Psychiatric:         Mood and Affect: Mood is anxious.         Speech: Speech normal.         Behavior: Behavior normal. Behavior is cooperative.         Thought Content: Thought content normal.         Judgment: Judgment normal.        Assessment and Plan (including Health Maintenance)      Problem List  Smart Sets  Document Outside HM   :    Plan:     Here for " recurrent right hand swelling and fingers.  Reports puffiness, tight and shiny   Difficulty wearing jewelry - has occurred to bilateral hands at times but mostly the right  Took steroids a month ago with relief   Rhematoid labs today  Discussed low sodium diet increase water intake  Could be related to meds.      There are no preventive care reminders to display for this patient.    Problem List Items Addressed This Visit          Psychiatric    Severe depression    Relevant Orders    Microalbumin/Creatinine Ratio, Urine (Completed)    CBC Auto Differential (Completed)    Comprehensive Metabolic Panel (Completed)       Cardiac/Vascular    Encounter for screening for cardiovascular disorders    Relevant Orders    Lipid Panel (Completed)       Endocrine    Hyperglycemia    Relevant Orders    Microalbumin/Creatinine Ratio, Urine (Completed)    CBC Auto Differential (Completed)    Comprehensive Metabolic Panel (Completed)    Hemoglobin A1C (Completed)       Orthopedic    Joint pain in fingers of right hand - Primary    Relevant Orders    Rheumatoid Factor Screen (Completed)    Rheumatoid Quantitative (Completed)    Sedimentation Rate (Completed)    FATIMAH EIA w/ Reflex to dsDNA/MARY    C-Reactive Protein (Completed)    Uric Acid (Completed)    Swelling of right hand    Relevant Orders    Rheumatoid Factor Screen (Completed)    Rheumatoid Quantitative (Completed)    Sedimentation Rate (Completed)    FATIMAH EIA w/ Reflex to dsDNA/MARY    C-Reactive Protein (Completed)    Uric Acid (Completed)       Palliative Care    Long term current use of therapeutic drug       Health Maintenance Topics with due status: Not Due       Topic Last Completion Date    TETANUS VACCINE 12/14/2022    Cervical Cancer Screening 02/13/2023    Diabetes Urine Screening 10/10/2024    Hemoglobin A1c 10/10/2024    RSV Vaccine (Age 60+ and Pregnant patients) Not Due       No future appointments.         Signature:  Hayley Donis, GUILHERME      [address] [place] [address]    Date of encounter: 10/10/24

## 2024-10-22 ENCOUNTER — PATIENT MESSAGE (OUTPATIENT)
Dept: FAMILY MEDICINE | Facility: CLINIC | Age: 34
End: 2024-10-22
Payer: OTHER GOVERNMENT

## 2024-10-28 ENCOUNTER — PATIENT MESSAGE (OUTPATIENT)
Dept: FAMILY MEDICINE | Facility: CLINIC | Age: 34
End: 2024-10-28
Payer: OTHER GOVERNMENT

## 2024-10-29 ENCOUNTER — TELEPHONE (OUTPATIENT)
Dept: FAMILY MEDICINE | Facility: CLINIC | Age: 34
End: 2024-10-29
Payer: OTHER GOVERNMENT

## 2024-10-29 DIAGNOSIS — M25.541 JOINT PAIN IN FINGERS OF RIGHT HAND: Primary | ICD-10-CM

## 2024-10-29 DIAGNOSIS — M79.89 SWELLING OF RIGHT HAND: ICD-10-CM

## 2024-11-26 ENCOUNTER — PATIENT MESSAGE (OUTPATIENT)
Dept: FAMILY MEDICINE | Facility: CLINIC | Age: 34
End: 2024-11-26
Payer: OTHER GOVERNMENT

## 2024-12-06 ENCOUNTER — TELEPHONE (OUTPATIENT)
Dept: FAMILY MEDICINE | Facility: CLINIC | Age: 34
End: 2024-12-06
Payer: OTHER GOVERNMENT

## 2024-12-06 ENCOUNTER — PATIENT MESSAGE (OUTPATIENT)
Dept: FAMILY MEDICINE | Facility: CLINIC | Age: 34
End: 2024-12-06
Payer: OTHER GOVERNMENT

## 2024-12-06 NOTE — TELEPHONE ENCOUNTER
----- Message from Kaylah sent at 12/6/2024 11:51 AM CST -----  Called patient. No answer. Left vm for her to call me back.  ----- Message -----  From: Anh Carmona LPN  Sent: 12/6/2024  11:33 AM CST  To: Kaylah Conley      ----- Message -----  From: Luly Isaacs  Sent: 12/6/2024  11:23 AM CST  To: [facility] Staff    Who Called: Esthela Robles    Caller is requesting assistance/information from provider's office.    Pt is wanting to speak megan Kaylah about a prev convo about her insurance.       Preferred Method of Contact: Phone Call  Patient's Preferred Phone Number on File: [phone]   Best Call Back Number, if different:  Additional Information:

## 2024-12-18 ENCOUNTER — PATIENT MESSAGE (OUTPATIENT)
Dept: FAMILY MEDICINE | Facility: CLINIC | Age: 34
End: 2024-12-18
Payer: OTHER GOVERNMENT

## 2025-01-17 ENCOUNTER — PATIENT MESSAGE (OUTPATIENT)
Dept: FAMILY MEDICINE | Facility: CLINIC | Age: 35
End: 2025-01-17
Payer: OTHER GOVERNMENT

## 2025-01-21 DIAGNOSIS — Z01.419 ENCOUNTER FOR GYNECOLOGICAL EXAMINATION WITHOUT ABNORMAL FINDING: Primary | ICD-10-CM

## 2025-01-30 ENCOUNTER — OFFICE VISIT (OUTPATIENT)
Dept: OBSTETRICS AND GYNECOLOGY | Facility: CLINIC | Age: 35
End: 2025-01-30
Payer: OTHER GOVERNMENT

## 2025-01-30 VITALS
TEMPERATURE: 98 F | WEIGHT: 186.19 LBS | BODY MASS INDEX: 29.92 KG/M2 | OXYGEN SATURATION: 97 % | HEART RATE: 88 BPM | DIASTOLIC BLOOD PRESSURE: 77 MMHG | RESPIRATION RATE: 18 BRPM | HEIGHT: 66 IN | SYSTOLIC BLOOD PRESSURE: 125 MMHG

## 2025-01-30 DIAGNOSIS — N93.9 ABNORMAL UTERINE BLEEDING: Primary | ICD-10-CM

## 2025-01-30 DIAGNOSIS — Z12.4 SCREENING FOR CERVICAL CANCER: ICD-10-CM

## 2025-01-30 DIAGNOSIS — Z01.419 ENCOUNTER FOR GYNECOLOGICAL EXAMINATION WITHOUT ABNORMAL FINDING: ICD-10-CM

## 2025-01-30 DIAGNOSIS — Z30.430 ENCOUNTER FOR IUD INSERTION: ICD-10-CM

## 2025-01-30 PROCEDURE — 99214 OFFICE O/P EST MOD 30 MIN: CPT | Mod: PBBFAC | Performed by: STUDENT IN AN ORGANIZED HEALTH CARE EDUCATION/TRAINING PROGRAM

## 2025-01-30 PROCEDURE — 99999 PR PBB SHADOW E&M-EST. PATIENT-LVL IV: CPT | Mod: PBBFAC,,, | Performed by: STUDENT IN AN ORGANIZED HEALTH CARE EDUCATION/TRAINING PROGRAM

## 2025-01-30 PROCEDURE — 99213 OFFICE O/P EST LOW 20 MIN: CPT | Mod: S$PBB,,, | Performed by: STUDENT IN AN ORGANIZED HEALTH CARE EDUCATION/TRAINING PROGRAM

## 2025-01-30 RX ORDER — MISOPROSTOL 200 UG/1
200 TABLET ORAL ONCE
Qty: 1 TABLET | Refills: 0 | Status: SHIPPED | OUTPATIENT
Start: 2025-01-30 | End: 2025-01-30

## 2025-01-30 NOTE — PROGRESS NOTES
"Return Gyn Office Visit    Assessment/Plan  Problem List Items Addressed This Visit    None  Visit Diagnoses       Abnormal uterine bleeding    -  Primary    Screening for cervical cancer        Encounter for gynecological examination without abnormal finding        Encounter for IUD insertion        Relevant Orders    Device Authorization Order              CC: No chief complaint on file.    HPI:  34 y.o. who presents to office for heavy periods, breast mass. Reports small spot in left breast that is tender to touch at times during her cycle. She is interested in Mirena IUD.    Review of Systems - The following ROS was otherwise negative, except as noted in the HPI:  constitutional, respiratory, cardiovascular, gastrointestinal, genitourinary    Objective:  /77 (BP Location: Left arm, Patient Position: Sitting)   Pulse 88   Temp 98.2 °F (36.8 °C)   Resp 18   Ht 5' 6" (1.676 m)   Wt 84.5 kg (186 lb 3.2 oz)   LMP 01/23/2025   SpO2 97%   BMI 30.05 kg/m²   General: Alert, well appearing, no acute distress  Breasts: right breast mass without masses or lesions, left breast with approx 5 mm lesion at 2 o'clock approx 2 cm from areola  Abdomen: Soft, nontender, nondistended   Pelvic: deferred  Extremities: No redness or tenderness, neg Maximiliano's sign  Osteopathic: no TART changes    Will continue to monitor breast lesion, consider imaging  Return in 2 weeks for Mirena IUD  "

## 2025-02-10 ENCOUNTER — TELEPHONE (OUTPATIENT)
Dept: OBSTETRICS AND GYNECOLOGY | Facility: CLINIC | Age: 35
End: 2025-02-10
Payer: OTHER GOVERNMENT

## 2025-02-10 NOTE — TELEPHONE ENCOUNTER
----- Message from Med Assistant Beauchamp sent at 2/7/2025  1:07 PM CST -----  Who Called: Esthela Robles    Caller is requesting assistance/information from provider's office.    Preferred Method of Contact: Phone Call  Patient's Preferred Phone Number on File: [phone]   Best Call Back Number, if different:  Additional Information: Did she get approval for the b/c insertion

## 2025-02-11 ENCOUNTER — TELEPHONE (OUTPATIENT)
Dept: OBSTETRICS AND GYNECOLOGY | Facility: CLINIC | Age: 35
End: 2025-02-11
Payer: OTHER GOVERNMENT

## 2025-02-11 NOTE — TELEPHONE ENCOUNTER
----- Message from Chloe sent at 2/10/2025  2:59 PM CST -----  Regarding: questions  Who Called: Esthela Robles    Caller is requesting assistance/information from provider's office.    Preferred Method of Contact: Phone Call  Patient's Preferred Phone Number on File: [phone]     Additional Information:  Mrs. Proctor called in reference to her IUD procedure she scheduled for 02/13/2025. She is trying to see if it has been approved by insurance also she needs to know if this can be done if she is on her cycle. Please reach out to her with information about her concerns.

## 2025-02-12 ENCOUNTER — TELEPHONE (OUTPATIENT)
Dept: OBSTETRICS AND GYNECOLOGY | Facility: CLINIC | Age: 35
End: 2025-02-12
Payer: OTHER GOVERNMENT

## 2025-02-12 NOTE — TELEPHONE ENCOUNTER
----- Message from Lexi sent at 2/12/2025  1:32 PM CST -----  Regarding: reschedule  Who Called: Esthela Robles    Caller is requesting assistance/information from provider's office.    Symptoms (please be specific): needs to reschedule due to being on her cycle          Preferred Method of Contact: Phone Call  Patient's Preferred Phone Number on File: [phone]   Best Call Back Number, if different:  Additional Information:

## 2025-02-17 ENCOUNTER — OFFICE VISIT (OUTPATIENT)
Dept: FAMILY MEDICINE | Facility: CLINIC | Age: 35
End: 2025-02-17
Payer: OTHER GOVERNMENT

## 2025-02-17 VITALS
HEART RATE: 83 BPM | BODY MASS INDEX: 31.92 KG/M2 | TEMPERATURE: 98 F | WEIGHT: 198.63 LBS | HEIGHT: 66 IN | OXYGEN SATURATION: 95 % | DIASTOLIC BLOOD PRESSURE: 71 MMHG | SYSTOLIC BLOOD PRESSURE: 114 MMHG

## 2025-02-17 DIAGNOSIS — R05.9 COUGH, UNSPECIFIED TYPE: ICD-10-CM

## 2025-02-17 DIAGNOSIS — J01.00 ACUTE NON-RECURRENT MAXILLARY SINUSITIS: ICD-10-CM

## 2025-02-17 DIAGNOSIS — F41.9 ANXIETY: ICD-10-CM

## 2025-02-17 DIAGNOSIS — J02.9 SORE THROAT: ICD-10-CM

## 2025-02-17 DIAGNOSIS — R09.81 NASAL CONGESTION: Primary | ICD-10-CM

## 2025-02-17 LAB
CTP QC/QA: YES
MOLECULAR STREP A: NEGATIVE
POC MOLECULAR INFLUENZA A AGN: NEGATIVE
POC MOLECULAR INFLUENZA B AGN: NEGATIVE
SARS-COV-2 RDRP RESP QL NAA+PROBE: NEGATIVE

## 2025-02-17 PROCEDURE — 87651 STREP A DNA AMP PROBE: CPT | Mod: QW,,,

## 2025-02-17 PROCEDURE — 87502 INFLUENZA DNA AMP PROBE: CPT | Mod: QW,,,

## 2025-02-17 RX ORDER — HYDROXYZINE HYDROCHLORIDE 25 MG/1
25 TABLET, FILM COATED ORAL 3 TIMES DAILY PRN
Qty: 90 TABLET | Refills: 0 | Status: SHIPPED | OUTPATIENT
Start: 2025-02-17

## 2025-02-17 RX ORDER — FLUCONAZOLE 150 MG/1
TABLET ORAL
Qty: 2 TABLET | Refills: 0 | Status: SHIPPED | OUTPATIENT
Start: 2025-02-17

## 2025-02-17 RX ORDER — BETAMETHASONE SODIUM PHOSPHATE AND BETAMETHASONE ACETATE 3; 3 MG/ML; MG/ML
6 INJECTION, SUSPENSION INTRA-ARTICULAR; INTRALESIONAL; INTRAMUSCULAR; SOFT TISSUE
Status: COMPLETED | OUTPATIENT
Start: 2025-02-17 | End: 2025-02-17

## 2025-02-17 RX ORDER — CEFDINIR 300 MG/1
300 CAPSULE ORAL 2 TIMES DAILY
Qty: 14 CAPSULE | Refills: 0 | Status: SHIPPED | OUTPATIENT
Start: 2025-02-17 | End: 2025-02-24

## 2025-02-17 RX ORDER — CEFTRIAXONE 1 G/1
1 INJECTION, POWDER, FOR SOLUTION INTRAMUSCULAR; INTRAVENOUS
Status: COMPLETED | OUTPATIENT
Start: 2025-02-17 | End: 2025-02-17

## 2025-02-17 RX ADMIN — BETAMETHASONE SODIUM PHOSPHATE AND BETAMETHASONE ACETATE 6 MG: 3; 3 INJECTION, SUSPENSION INTRA-ARTICULAR; INTRALESIONAL; INTRAMUSCULAR; SOFT TISSUE at 09:02

## 2025-02-17 RX ADMIN — CEFTRIAXONE 1 G: 1 INJECTION, POWDER, FOR SOLUTION INTRAMUSCULAR; INTRAVENOUS at 09:02

## 2025-02-17 NOTE — PROGRESS NOTES
Saji Vásquez NP   [address] [place] [address]     PATIENT NAME: Esthela Robles  : 1990  DATE: 25  MRN: [id number]      Billing Provider: Saji Vásquez NP  Level of Service:   Patient PCP Information       Provider PCP Type    Hayley Donis DNP General            Reason for Visit / Chief Complaint: Cough (ass), Annual Exam, Nasal Congestion, Sore Throat, and Headache       Update PCP  Update Chief Complaint         History of Present Illness / Problem Focused Workflow     Esthela Robles presents to the clinic with Cough (ass), Annual Exam, Nasal Congestion, Sore Throat, and Headache     Patient here today with complaints of sore throat, cough, nasal congestion. Headache. Symptoms present for the past week. Denies fever, body aches, or chills. Tests today negative. Rocephin and Celestone administered in clinic. Cefdinir sent to pharmacy. Requesting refill of Hydroxyzine for anxiety. Advised to change tooth brush. Return to clinic if symptoms worsen and as needed.     Review of Systems     Review of Systems   Constitutional:  Negative for chills and fever.   HENT:  Positive for nasal congestion, postnasal drip, rhinorrhea, sinus pressure/congestion and sore throat. Negative for ear discharge and ear pain.    Eyes: Negative.    Respiratory:  Positive for cough. Negative for shortness of breath and wheezing.    Cardiovascular:  Positive for chest pain. Negative for palpitations.   Gastrointestinal: Negative.  Negative for abdominal pain, nausea and vomiting.   Endocrine: Negative.    Genitourinary: Negative.    Musculoskeletal: Negative.    Integumentary:  Negative.   Allergic/Immunologic: Negative.    Neurological:  Positive for headaches. Negative for dizziness.   Psychiatric/Behavioral: Negative.        Medical / Social / Family History     Past Medical History:   Diagnosis Date    Depression     GERD (gastroesophageal reflux disease)        Past Surgical History:   Procedure  "Laterality Date    CHOLECYSTECTOMY  2013       Social History  Ms.  reports that she has been smoking cigarettes. She started smoking about 16 years ago. She has a 8.5 pack-year smoking history. She has never been exposed to tobacco smoke. She has never used smokeless tobacco. She reports that she does not drink alcohol and does not use drugs.    Family History  Ms.'s family history includes Diabetes in her maternal grandmother and paternal grandfather; No Known Problems in her father and mother.    Medications and Allergies     Medications  Outpatient Medications Marked as Taking for the 2/17/25 encounter (Office Visit) with Saji Vásquez NP   Medication Sig Dispense Refill    buPROPion (WELLBUTRIN) 75 MG tablet Take 2 tablets (150 mg total) by mouth once daily. 60 tablet 1    omeprazole (PRILOSEC) 40 MG capsule Take 1 capsule (40 mg total) by mouth once daily. 90 capsule 3     Current Facility-Administered Medications for the 2/17/25 encounter (Office Visit) with Saji Vásquez NP   Medication Dose Route Frequency Provider Last Rate Last Admin    [COMPLETED] betamethasone acetate-betamethasone sodium phosphate injection 6 mg  6 mg Intramuscular 1 time in Clinic/HOD Saji Vásquez NP   6 mg at 02/17/25 0920    [COMPLETED] cefTRIAXone injection 1 g  1 g Intramuscular 1 time in Clinic/HOD Saji Vásquez NP   1 g at 02/17/25 0920       Allergies  Review of patient's allergies indicates:   Allergen Reactions    Codeine Nausea And Vomiting    Hazelnut        Physical Examination   /71 (BP Location: Left arm, Patient Position: Sitting)   Pulse 83   Temp 97.6 °F (36.4 °C) (Oral)   Ht 5' 6" (1.676 m)   Wt 90.1 kg (198 lb 9.6 oz)   LMP 02/10/2025 (Approximate)   SpO2 95%   BMI 32.05 kg/m²    Physical Exam  Vitals reviewed.   Constitutional:       Appearance: She is obese. She is ill-appearing.   HENT:      Right Ear: Tympanic membrane, ear canal and external ear normal.      Left Ear: " Tympanic membrane, ear canal and external ear normal.      Nose: Congestion and rhinorrhea present. Rhinorrhea is purulent.      Mouth/Throat:      Pharynx: Pharyngeal swelling and posterior oropharyngeal erythema present. No oropharyngeal exudate.   Eyes:      Extraocular Movements: Extraocular movements intact.      Conjunctiva/sclera: Conjunctivae normal.      Pupils: Pupils are equal, round, and reactive to light.   Cardiovascular:      Rate and Rhythm: Normal rate and regular rhythm.      Pulses: Normal pulses.      Heart sounds: Normal heart sounds.   Pulmonary:      Effort: Pulmonary effort is normal. No respiratory distress.      Breath sounds: Normal breath sounds. No wheezing.   Abdominal:      General: Bowel sounds are normal.      Palpations: Abdomen is soft.      Tenderness: There is no abdominal tenderness. There is no guarding.   Musculoskeletal:         General: Normal range of motion.      Cervical back: Normal range of motion and neck supple.   Lymphadenopathy:      Cervical: Cervical adenopathy present.   Skin:     General: Skin is warm and dry.      Capillary Refill: Capillary refill takes less than 2 seconds.   Neurological:      General: No focal deficit present.      Mental Status: She is alert and oriented to person, place, and time.   Psychiatric:         Mood and Affect: Mood normal.         Behavior: Behavior normal.        Assessment and Plan (including Health Maintenance)      Problem List  Smart Sets  Document Outside HM   :    Plan:   Tests today negative.  Rocephin and Celestone administered in clinic  Cefdinir sent to pharmacy  Hydroxyzine refilled   Advised to change tooth brush.   Return to clinic if symptoms worsen and as needed.         There are no preventive care reminders to display for this patient.    Problem List Items Addressed This Visit       Sore throat    Relevant Orders    POCT Influenza A/B Molecular (Completed)    POCT COVID-19 Rapid Screening (Completed)    POCT  Strep A, Molecular (Completed)    Cough    Relevant Orders    POCT Influenza A/B Molecular (Completed)    POCT COVID-19 Rapid Screening (Completed)    POCT Strep A, Molecular (Completed)    Nasal congestion - Primary    Relevant Orders    POCT Influenza A/B Molecular (Completed)    POCT COVID-19 Rapid Screening (Completed)    POCT Strep A, Molecular (Completed)       Health Maintenance Topics with due status: Not Due       Topic Last Completion Date    TETANUS VACCINE 12/14/2022    Cervical Cancer Screening 02/13/2023    Diabetes Urine Screening 10/10/2024    Hemoglobin A1c 10/10/2024    RSV Vaccine (Age 60+ and Pregnant patients) Not Due       Future Appointments   Date Time Provider Department Center   2/20/2025  2:30 PM Amrik Craven DO [facility] [place]            Signature:  Saji Vásquez NP      [address] [place] [address]    Date of encounter: 2/17/25

## 2025-02-17 NOTE — PATIENT INSTRUCTIONS
Tests today negative.  Rocephin and Celestone administered in clinic  Cefdinir sent to pharmacy  Hydroxyzine refilled   Advised to change tooth brush.   Return to clinic if symptoms worsen and as needed.

## 2025-02-18 ENCOUNTER — TELEPHONE (OUTPATIENT)
Dept: OBSTETRICS AND GYNECOLOGY | Facility: CLINIC | Age: 35
End: 2025-02-18
Payer: OTHER GOVERNMENT

## 2025-02-18 DIAGNOSIS — Z30.430 ENCOUNTER FOR IUD INSERTION: ICD-10-CM

## 2025-02-18 RX ORDER — MISOPROSTOL 200 UG/1
200 TABLET ORAL ONCE
Qty: 1 TABLET | Refills: 0 | Status: SHIPPED | OUTPATIENT
Start: 2025-02-18 | End: 2025-02-18 | Stop reason: SDUPTHER

## 2025-02-18 RX ORDER — MISOPROSTOL 200 UG/1
200 TABLET ORAL ONCE
Qty: 1 TABLET | Refills: 0 | Status: SHIPPED | OUTPATIENT
Start: 2025-02-18 | End: 2025-02-18

## 2025-02-18 NOTE — TELEPHONE ENCOUNTER
----- Message from Med Assistant Beauchamp sent at 2/18/2025  8:39 AM CST -----  Regarding: Niki  Who Called: Esthela Salinas is requesting assistance/information from provider's office.Preferred Method of Contact: Phone CallPatient's Preferred Phone Number on File: [phone] Best Call Back Number, if different:Additional Information: had vm from niki saying insurance was approved for surgery. Portal saying owes money still

## 2025-02-20 ENCOUNTER — PROCEDURE VISIT (OUTPATIENT)
Dept: OBSTETRICS AND GYNECOLOGY | Facility: CLINIC | Age: 35
End: 2025-02-20
Payer: OTHER GOVERNMENT

## 2025-02-20 VITALS
SYSTOLIC BLOOD PRESSURE: 115 MMHG | DIASTOLIC BLOOD PRESSURE: 73 MMHG | WEIGHT: 189 LBS | BODY MASS INDEX: 30.51 KG/M2 | HEART RATE: 63 BPM

## 2025-02-20 DIAGNOSIS — Z30.430 ENCOUNTER FOR IUD INSERTION: Primary | ICD-10-CM

## 2025-02-20 LAB
B-HCG UR QL: NEGATIVE
CTP QC/QA: YES

## 2025-02-20 PROCEDURE — 58300 INSERT INTRAUTERINE DEVICE: CPT | Mod: PBBFAC | Performed by: STUDENT IN AN ORGANIZED HEALTH CARE EDUCATION/TRAINING PROGRAM

## 2025-02-20 PROCEDURE — 99999PBSHW PR PBB SHADOW TECHNICAL ONLY FILED TO HB: Mod: PBBFAC,,,

## 2025-02-20 PROCEDURE — 99999PBSHW POCT URINE PREGNANCY: Mod: PBBFAC,,,

## 2025-02-20 PROCEDURE — 99499 UNLISTED E&M SERVICE: CPT | Mod: S$PBB,,, | Performed by: STUDENT IN AN ORGANIZED HEALTH CARE EDUCATION/TRAINING PROGRAM

## 2025-02-20 PROCEDURE — 81025 URINE PREGNANCY TEST: CPT | Mod: PBBFAC | Performed by: STUDENT IN AN ORGANIZED HEALTH CARE EDUCATION/TRAINING PROGRAM

## 2025-02-20 RX ADMIN — LEVONORGESTREL 1 INTRA UTERINE DEVICE: 52 INTRAUTERINE DEVICE INTRAUTERINE at 02:02

## 2025-03-01 NOTE — PROCEDURES
Insertion of IUD    Date/Time: 2/20/2025 2:30 PM    Performed by: Amrik Craven DO  Authorized by: Amrik Craven DO    Consent:     Consent obtained:  Prior to procedure the appropriate consent was completed and verified    Consent given by:  Patient    Procedure risks and benefits discussed: yes      Patient questions answered: yes      Patient agrees, verbalizes understanding, and wants to proceed: yes     Device to be inserted was verified by patient: yes  Insertion Procedure:   1 Intra Uterine Device levonorgestreL 52 mg       Pelvic exam performed: yes      Negative urine pregnancy test: yes      Cervix cleaned and prepped: yes      Speculum placed in vagina: yes      Tenaculum applied to cervix: yes      Uterus sounded: yes      Uterus sound depth (cm):  8    IUD inserted with no complications: yes      IUD type:  Mirena    Strings trimmed: yes    Post-procedure:     Patient tolerated procedure well: yes    Comments:      Follow up in 4 weeks.

## 2025-04-23 ENCOUNTER — OFFICE VISIT (OUTPATIENT)
Dept: OBSTETRICS AND GYNECOLOGY | Facility: CLINIC | Age: 35
End: 2025-04-23
Payer: OTHER GOVERNMENT

## 2025-04-23 VITALS
HEIGHT: 66 IN | HEART RATE: 78 BPM | BODY MASS INDEX: 30.37 KG/M2 | SYSTOLIC BLOOD PRESSURE: 124 MMHG | WEIGHT: 189 LBS | DIASTOLIC BLOOD PRESSURE: 74 MMHG

## 2025-04-23 DIAGNOSIS — Z30.431 IUD CHECK UP: Primary | ICD-10-CM

## 2025-04-23 PROCEDURE — 99213 OFFICE O/P EST LOW 20 MIN: CPT | Mod: S$PBB,,, | Performed by: STUDENT IN AN ORGANIZED HEALTH CARE EDUCATION/TRAINING PROGRAM

## 2025-04-23 PROCEDURE — 99999 PR PBB SHADOW E&M-EST. PATIENT-LVL III: CPT | Mod: PBBFAC,,, | Performed by: STUDENT IN AN ORGANIZED HEALTH CARE EDUCATION/TRAINING PROGRAM

## 2025-04-23 PROCEDURE — 99213 OFFICE O/P EST LOW 20 MIN: CPT | Mod: PBBFAC | Performed by: STUDENT IN AN ORGANIZED HEALTH CARE EDUCATION/TRAINING PROGRAM

## 2025-04-23 NOTE — PROGRESS NOTES
"  Return Gyn Office Visit    Assessment/Plan  Problem List Items Addressed This Visit    None  Visit Diagnoses         IUD check up    -  Primary              CC:   Chief Complaint   Patient presents with    Follow-up     Pt is here for an iud check and c/o of having a cycle longer than normal.      HPI:  34 y.o. who presents to office for IUD check. Denies any problems or concerns. Having irregular bleeding. Denies problems with intercourse.    Review of Systems - The following ROS was otherwise negative, except as noted in the HPI:  constitutional, respiratory, cardiovascular, gastrointestinal, genitourinary    Objective:  /74 (BP Location: Right arm, Patient Position: Sitting)   Pulse 78   Ht 5' 6" (1.676 m)   Wt 85.7 kg (189 lb)   LMP 04/15/2025 (Approximate)   BMI 30.51 kg/m²   General: Alert, well appearing, no acute distress  Abdomen: Soft, nontender, nondistended   Pelvic: External genitalia without masses or lesions.  Moist, pink vagina with physiologic discharge.  Cervix without polyps or masses, IUD strings visible.  Extremities: No redness or tenderness, neg Maximiliano's sign  Osteopathic: no TART changes    Counseled on timing of irregular bleeding after IUD placement  Follow up in 6 mo   "

## 2025-04-28 ENCOUNTER — PATIENT MESSAGE (OUTPATIENT)
Dept: FAMILY MEDICINE | Facility: CLINIC | Age: 35
End: 2025-04-28
Payer: OTHER GOVERNMENT

## 2025-05-02 ENCOUNTER — OFFICE VISIT (OUTPATIENT)
Dept: FAMILY MEDICINE | Facility: CLINIC | Age: 35
End: 2025-05-02
Payer: OTHER GOVERNMENT

## 2025-05-02 VITALS
WEIGHT: 180 LBS | BODY MASS INDEX: 28.93 KG/M2 | SYSTOLIC BLOOD PRESSURE: 110 MMHG | RESPIRATION RATE: 20 BRPM | DIASTOLIC BLOOD PRESSURE: 76 MMHG | OXYGEN SATURATION: 98 % | TEMPERATURE: 98 F | HEIGHT: 66 IN | HEART RATE: 80 BPM

## 2025-05-02 DIAGNOSIS — R59.0 CERVICAL LYMPHADENOPATHY: ICD-10-CM

## 2025-05-02 DIAGNOSIS — Z20.818 EXPOSURE TO STREPTOCOCCAL PHARYNGITIS: Primary | ICD-10-CM

## 2025-05-02 DIAGNOSIS — R05.1 ACUTE COUGH: ICD-10-CM

## 2025-05-02 PROBLEM — R42 DIZZINESS: Status: RESOLVED | Noted: 2024-02-05 | Resolved: 2025-05-02

## 2025-05-02 PROBLEM — Z79.899 LONG TERM CURRENT USE OF THERAPEUTIC DRUG: Status: RESOLVED | Noted: 2024-03-11 | Resolved: 2025-05-02

## 2025-05-02 PROBLEM — Z23 NEED FOR VACCINATION: Status: RESOLVED | Noted: 2024-03-11 | Resolved: 2025-05-02

## 2025-05-02 PROBLEM — J32.0 MAXILLARY SINUSITIS: Status: RESOLVED | Noted: 2024-01-21 | Resolved: 2025-05-02

## 2025-05-02 PROBLEM — J02.9 SORE THROAT: Status: RESOLVED | Noted: 2024-01-21 | Resolved: 2025-05-02

## 2025-05-02 PROBLEM — R05.9 COUGH: Status: RESOLVED | Noted: 2024-01-21 | Resolved: 2025-05-02

## 2025-05-02 PROBLEM — Z13.6 ENCOUNTER FOR SCREENING FOR CARDIOVASCULAR DISORDERS: Status: RESOLVED | Noted: 2022-03-28 | Resolved: 2025-05-02

## 2025-05-02 PROBLEM — F41.9 SEVERE ANXIETY: Status: RESOLVED | Noted: 2022-05-18 | Resolved: 2025-05-02

## 2025-05-02 PROBLEM — M79.89 SWELLING OF RIGHT HAND: Status: RESOLVED | Noted: 2024-09-16 | Resolved: 2025-05-02

## 2025-05-02 PROBLEM — E53.9 VITAMIN B DEFICIENCY: Status: RESOLVED | Noted: 2022-03-28 | Resolved: 2025-05-02

## 2025-05-02 PROBLEM — H92.01 ACUTE OTALGIA, RIGHT: Status: RESOLVED | Noted: 2024-08-21 | Resolved: 2025-05-02

## 2025-05-02 PROBLEM — F32.2 SEVERE DEPRESSION: Status: RESOLVED | Noted: 2022-05-13 | Resolved: 2025-05-02

## 2025-05-02 PROBLEM — R53.83 FATIGUE: Status: RESOLVED | Noted: 2024-03-11 | Resolved: 2025-05-02

## 2025-05-02 PROBLEM — E55.9 VITAMIN D DEFICIENCY: Status: RESOLVED | Noted: 2022-03-28 | Resolved: 2025-05-02

## 2025-05-02 PROBLEM — R09.81 NASAL CONGESTION: Status: RESOLVED | Noted: 2024-01-21 | Resolved: 2025-05-02

## 2025-05-02 PROBLEM — M25.541 JOINT PAIN IN FINGERS OF RIGHT HAND: Status: RESOLVED | Noted: 2024-10-11 | Resolved: 2025-05-02

## 2025-05-02 PROBLEM — R73.9 HYPERGLYCEMIA: Status: RESOLVED | Noted: 2024-03-11 | Resolved: 2025-05-02

## 2025-05-02 RX ORDER — CEFDINIR 300 MG/1
300 CAPSULE ORAL 2 TIMES DAILY
Qty: 14 CAPSULE | Refills: 0 | Status: SHIPPED | OUTPATIENT
Start: 2025-05-02 | End: 2025-05-09

## 2025-05-02 RX ORDER — LEVOCETIRIZINE DIHYDROCHLORIDE 5 MG/1
5 TABLET, FILM COATED ORAL NIGHTLY
Qty: 30 TABLET | Refills: 1 | Status: SHIPPED | OUTPATIENT
Start: 2025-05-02

## 2025-05-02 RX ORDER — METHYLPREDNISOLONE 4 MG/1
TABLET ORAL
Qty: 21 TABLET | Refills: 0 | Status: SHIPPED | OUTPATIENT
Start: 2025-05-02

## 2025-05-02 NOTE — PROGRESS NOTES
Hayley Donis DNP   [address] [place] [address]     PATIENT NAME: Esthela Robles  : 1990  DATE: 25  MRN: [id number]      Billing Provider: Hayley Donis DNP  Level of Service: MI OFFICE/OUTPT VISIT, EST, LEVL III, 20-29 MIN  Patient PCP Information       Provider PCP Type    Hayley Donis DNP General            Reason for Visit / Chief Complaint: Sinus Problem, Headache, and Nasal Congestion (Drainage nose)       Update PCP  Update Chief Complaint         History of Present Illness / Problem Focused Workflow     Esthela Robles presents to the clinic with Sinus Problem, Headache, and Nasal Congestion (Drainage nose)     Sinus Problem  Associated symptoms include headaches.   Headache     Review of Systems     Review of Systems   Neurological:  Positive for headaches.      Medical / Social / Family History     Past Medical History:   Diagnosis Date    Depression     GERD (gastroesophageal reflux disease)        Past Surgical History:   Procedure Laterality Date    CHOLECYSTECTOMY         Social History  Ms.  reports that she has been smoking cigarettes. She started smoking about 17 years ago. She has a 8.6 pack-year smoking history. She has never been exposed to tobacco smoke. She has never used smokeless tobacco. She reports that she does not drink alcohol and does not use drugs.    Family History  Ms.'s family history includes Diabetes in her maternal grandmother and paternal grandfather; No Known Problems in her father and mother.    Medications and Allergies     Medications  No outpatient medications have been marked as taking for the 25 encounter (Office Visit) with Hayley Donis DNP.     Current Facility-Administered Medications for the 25 encounter (Office Visit) with Hayley Donis DNP   Medication Dose Route Frequency Provider Last Rate Last Admin    levonorgestreL (Mirena) 52 mg IUD 1 Intra Uterine Device  1 Intra Uterine Device Intrauterine   "Amrik Craven, DO   1 Intra Uterine Device at 02/20/25 1430       Allergies  Review of patient's allergies indicates:   Allergen Reactions    Codeine Nausea And Vomiting    Hazelnut        Physical Examination   /76 (BP Location: Left arm, Patient Position: Sitting)   Pulse 80   Temp 97.6 °F (36.4 °C) (Oral)   Resp 20   Ht 5' 6" (1.676 m)   Wt 81.6 kg (180 lb)   LMP 04/15/2025 (Approximate)   SpO2 98%   BMI 29.05 kg/m²    Physical Exam  Vitals and nursing note reviewed.   Constitutional:       Appearance: Normal appearance. She is obese. She is ill-appearing.   HENT:      Head: Normocephalic.      Nose: Congestion and rhinorrhea present.      Mouth/Throat:      Mouth: Mucous membranes are moist.      Pharynx: Oropharyngeal exudate and posterior oropharyngeal erythema present.      Comments: +strawberry tongue   Eyes:      Extraocular Movements: Extraocular movements intact.      Conjunctiva/sclera: Conjunctivae normal.      Pupils: Pupils are equal, round, and reactive to light.   Cardiovascular:      Rate and Rhythm: Normal rate and regular rhythm.      Pulses: Normal pulses.      Heart sounds: Normal heart sounds.   Pulmonary:      Effort: Pulmonary effort is normal.      Breath sounds: Normal breath sounds.   Chest:      Chest wall: Tenderness present.   Abdominal:      General: Abdomen is flat. Bowel sounds are normal.      Palpations: Abdomen is soft.   Musculoskeletal:         General: Normal range of motion.      Cervical back: Normal range of motion.   Lymphadenopathy:      Cervical: Cervical adenopathy present.   Skin:     General: Skin is warm and dry.      Capillary Refill: Capillary refill takes less than 2 seconds.   Neurological:      General: No focal deficit present.      Mental Status: She is alert and oriented to person, place, and time. Mental status is at baseline.   Psychiatric:         Mood and Affect: Mood normal.         Behavior: Behavior normal.         Thought " Content: Thought content normal.         Judgment: Judgment normal.        Assessment and Plan (including Health Maintenance)      Problem List  Smart Sets  Document Outside HM   :    Plan:         Health Maintenance Due   Topic Date Due    Hemoglobin A1c  04/10/2025       Problem List Items Addressed This Visit          Pulmonary    Acute cough       ID    Exposure to Streptococcal pharyngitis - Primary       Endocrine    BMI 30.0-30.9,adult       Other    Cervical lymphadenopathy       Health Maintenance Topics with due status: Not Due       Topic Last Completion Date    TETANUS VACCINE 12/14/2022    Cervical Cancer Screening 02/13/2023    Diabetes Urine Screening 10/10/2024    RSV Vaccine (Age 60+ and Pregnant patients) Not Due       No future appointments.         Signature:  Hayley Donis, GUILHERME      [address] [place] [address]    Date of encounter: 5/2/25

## 2025-05-16 ENCOUNTER — PATIENT MESSAGE (OUTPATIENT)
Dept: FAMILY MEDICINE | Facility: CLINIC | Age: 35
End: 2025-05-16
Payer: OTHER GOVERNMENT

## 2025-05-19 RX ORDER — FLUCONAZOLE 100 MG/1
100 TABLET ORAL DAILY
Qty: 5 TABLET | Refills: 0 | Status: SHIPPED | OUTPATIENT
Start: 2025-05-19 | End: 2025-05-24

## 2025-08-25 ENCOUNTER — PATIENT MESSAGE (OUTPATIENT)
Facility: HOSPITAL | Age: 35
End: 2025-08-25
Payer: OTHER GOVERNMENT